# Patient Record
Sex: FEMALE | Race: BLACK OR AFRICAN AMERICAN | Employment: UNEMPLOYED | URBAN - METROPOLITAN AREA
[De-identification: names, ages, dates, MRNs, and addresses within clinical notes are randomized per-mention and may not be internally consistent; named-entity substitution may affect disease eponyms.]

---

## 2019-07-25 ENCOUNTER — HOSPITAL ENCOUNTER (OUTPATIENT)
Dept: ULTRASOUND IMAGING | Age: 59
Discharge: HOME | End: 2019-07-25
Attending: INTERNAL MEDICINE
Payer: COMMERCIAL

## 2019-07-25 DIAGNOSIS — R14.0 GASTRIC TYMPANY: ICD-10-CM

## 2019-07-25 DIAGNOSIS — N18.9 CHRONIC KIDNEY DISEASE, UNSPECIFIED: ICD-10-CM

## 2019-07-25 PROCEDURE — 76700 US EXAM ABDOM COMPLETE: CPT

## 2019-07-26 ENCOUNTER — HOSPITAL ENCOUNTER (OUTPATIENT)
Dept: CT IMAGING | Age: 59
Discharge: HOME | End: 2019-07-26
Attending: INTERNAL MEDICINE
Payer: COMMERCIAL

## 2019-07-26 DIAGNOSIS — I50.31 ACUTE DIASTOLIC HEART FAILURE (HCC): ICD-10-CM

## 2019-07-26 DIAGNOSIS — R14.0 ABDOMINAL DISTENTION: ICD-10-CM

## 2019-07-26 LAB — CREAT UR-MCNC: 1.2 MG/DL (ref 0.6–1.3)

## 2019-07-26 PROCEDURE — 74011636320 HC RX REV CODE- 636/320: Performed by: RADIOLOGY

## 2019-07-26 PROCEDURE — 82565 ASSAY OF CREATININE: CPT

## 2019-07-26 PROCEDURE — 74177 CT ABD & PELVIS W/CONTRAST: CPT

## 2019-07-26 RX ADMIN — IOHEXOL 50 ML: 240 INJECTION, SOLUTION INTRATHECAL; INTRAVASCULAR; INTRAVENOUS; ORAL at 14:19

## 2019-07-26 RX ADMIN — IOPAMIDOL 93 ML: 612 INJECTION, SOLUTION INTRAVENOUS at 14:19

## 2019-11-08 ENCOUNTER — OFFICE VISIT (OUTPATIENT)
Dept: FAMILY MEDICINE CLINIC | Age: 59
End: 2019-11-08

## 2019-11-08 VITALS
TEMPERATURE: 98 F | DIASTOLIC BLOOD PRESSURE: 52 MMHG | HEART RATE: 51 BPM | WEIGHT: 246.4 LBS | HEIGHT: 64 IN | OXYGEN SATURATION: 98 % | SYSTOLIC BLOOD PRESSURE: 132 MMHG | BODY MASS INDEX: 42.07 KG/M2 | RESPIRATION RATE: 18 BRPM

## 2019-11-08 DIAGNOSIS — I50.32 CHRONIC DIASTOLIC HEART FAILURE (HCC): Primary | ICD-10-CM

## 2019-11-08 DIAGNOSIS — Z79.4 DIABETES MELLITUS, TYPE II, INSULIN DEPENDENT (HCC): ICD-10-CM

## 2019-11-08 DIAGNOSIS — E11.9 DIABETES MELLITUS, TYPE II, INSULIN DEPENDENT (HCC): ICD-10-CM

## 2019-11-08 DIAGNOSIS — I10 ESSENTIAL HYPERTENSION WITH GOAL BLOOD PRESSURE LESS THAN 130/80: ICD-10-CM

## 2019-11-08 DIAGNOSIS — R21 RASH: ICD-10-CM

## 2019-11-08 DIAGNOSIS — N18.30 CKD (CHRONIC KIDNEY DISEASE) STAGE 3, GFR 30-59 ML/MIN (HCC): ICD-10-CM

## 2019-11-08 PROBLEM — H35.039 HYPERTENSIVE RETINOPATHY: Status: ACTIVE | Noted: 2019-11-08

## 2019-11-08 PROBLEM — E78.5 HYPERLIPIDEMIA: Status: ACTIVE | Noted: 2019-11-08

## 2019-11-08 PROBLEM — M10.09 ACUTE IDIOPATHIC GOUT OF MULTIPLE SITES: Status: ACTIVE | Noted: 2019-04-17

## 2019-11-08 PROBLEM — I50.31 ACUTE DIASTOLIC HEART FAILURE (HCC): Status: ACTIVE | Noted: 2019-09-13

## 2019-11-08 PROBLEM — D50.9 IRON DEFICIENCY ANEMIA: Status: ACTIVE | Noted: 2019-04-13

## 2019-11-08 LAB — HBA1C MFR BLD HPLC: 6.2 %

## 2019-11-08 RX ORDER — LANOLIN ALCOHOL/MO/W.PET/CERES
CREAM (GRAM) TOPICAL
COMMUNITY
End: 2020-11-06

## 2019-11-08 RX ORDER — INSULIN GLARGINE 100 [IU]/ML
60 INJECTION, SOLUTION SUBCUTANEOUS
COMMUNITY
End: 2019-12-27 | Stop reason: SDUPTHER

## 2019-11-08 RX ORDER — ASPIRIN 325 MG
325 TABLET ORAL DAILY
COMMUNITY
End: 2020-11-06 | Stop reason: SDUPTHER

## 2019-11-08 RX ORDER — BUMETANIDE 1 MG/1
2 TABLET ORAL 2 TIMES DAILY
COMMUNITY
End: 2020-02-24 | Stop reason: SDUPTHER

## 2019-11-08 RX ORDER — SPIRONOLACTONE 25 MG/1
TABLET ORAL DAILY
COMMUNITY
End: 2020-11-06 | Stop reason: SDUPTHER

## 2019-11-08 RX ORDER — ATORVASTATIN CALCIUM 40 MG/1
TABLET, FILM COATED ORAL DAILY
COMMUNITY
End: 2020-02-24 | Stop reason: SDUPTHER

## 2019-11-08 RX ORDER — GABAPENTIN 100 MG/1
CAPSULE ORAL 3 TIMES DAILY
COMMUNITY
End: 2020-03-30

## 2019-11-08 RX ORDER — METOPROLOL SUCCINATE 200 MG/1
200 TABLET, EXTENDED RELEASE ORAL DAILY
COMMUNITY
End: 2020-02-24 | Stop reason: SDUPTHER

## 2019-11-08 RX ORDER — HYDRALAZINE HYDROCHLORIDE 100 MG/1
100 TABLET, FILM COATED ORAL EVERY 8 HOURS
COMMUNITY
End: 2020-07-14 | Stop reason: SDUPTHER

## 2019-11-08 RX ORDER — METOLAZONE 5 MG/1
TABLET ORAL DAILY
COMMUNITY
End: 2020-11-06

## 2019-11-08 RX ORDER — AMLODIPINE BESYLATE 10 MG/1
TABLET ORAL DAILY
COMMUNITY
End: 2020-02-24 | Stop reason: SDUPTHER

## 2019-11-08 NOTE — PROGRESS NOTES
[facility]    CC: EOC for Chronic Disease Management    HPI:     HTN:  -Taking BP medication as prescribed  -Denies any side effects or issues with BP medication  -Does not check BP at home  -Chart review shows BP was 118/56 during last visit with cardiology on 10/3/2019  -Not following any regular exercise regimen      CKD3:  -Seeing nephrologist for issue ([facility])  -States she last saw specialist in October  -No changes done to her medication at that visit  -Reports that issue has been stable  -Next appointment with nephrologist is in January  -[facility] Chart review shows AA eGFR was 48.7 on 5/88/7271      Diastolic Heart Failure:  -Being followed by cardiology (Dr. Ashford Monday)  -Saw NP at cardiologist office for issue on 10/3/2019  -Taking medications as prescribed by cardiology  -Denies any side effects or issues with her medication  -Checks weight daily  -Denies any issues with weight gain    -Echo on 9/17/2019 shows normal diastolic dysfunction and EF of 60-65%  -Had grade II diastolic dysfunction on 2/04/2922 and 4/14/2019 and grade I on 7/5/2018 and 12/23/2019      Rash:  -Issue started 3 months  -Is on both shoulder  -Small bumps on skin  -Associated with itching  -Thinks she was diagnosed with folliculitis ([facility] chart review shows diagnosis of keratosis pilaris)  -Wishes to see specialist for issue      ROS: Positive items marked in RED  CON: fever, chills  Cardiovascular: palpitations, CP  Resp: SOB, cough  GI: nausea, vomiting, diarrhea  : dysuria, hematuria      Past Medical History:   Diagnosis Date    Congestive heart failure ([address] 2017    [facility]     Gout     Hypertension     Iron deficiency anemia 4/13/2019    Retinopathy        Past Surgical History:   Procedure Laterality Date    HX WISDOM TEETH EXTRACTION         Family History   Problem Relation Age of Onset    Diabetes Mother     No Known Problems Father        Social History     Socioeconomic History    Marital status:      Spouse name: Not on file    Number of children: Not on file    Years of education: Not on file    Highest education level: Not on file   Tobacco Use    Smoking status: Never Smoker    Smokeless tobacco: Never Used   Substance and Sexual Activity    Alcohol use: Not Currently    Drug use: Never    Sexual activity: Not Currently       Allergies no known allergies      Current Outpatient Medications:     insulin glargine (LANTUS SOLOSTAR U-100 INSULIN) 100 unit/mL (3 mL) inpn, 60 Units by SubCUTAneous route., Disp: , Rfl:     metoprolol succinate (TOPROL-XL) 200 mg XL tablet, Take 200 mg by mouth daily. , Disp: , Rfl:     bumetanide (BUMEX) 1 mg tablet, Take 2 mg by mouth two (2) times a day., Disp: , Rfl:     ferrous sulfate 325 mg (65 mg iron) tablet, Take  by mouth Daily (before breakfast). , Disp: , Rfl:     spironolactone (ALDACTONE) 25 mg tablet, Take  by mouth daily. , Disp: , Rfl:     metOLazone (ZAROXOLYN) 5 mg tablet, Take  by mouth daily. , Disp: , Rfl:     atorvastatin (LIPITOR) 40 mg tablet, Take  by mouth daily. , Disp: , Rfl:     amLODIPine (NORVASC) 10 mg tablet, Take  by mouth daily. , Disp: , Rfl:     aspirin (ASPIRIN) 325 mg tablet, Take 325 mg by mouth daily. , Disp: , Rfl:     hydrALAZINE (APRESOLINE) 100 mg tablet, Take 100 mg by mouth every eight (8) hours. , Disp: , Rfl:     gabapentin (NEURONTIN) 100 mg capsule, Take  by mouth three (3) times daily. , Disp: , Rfl:     Physical Exam:      /52   Pulse (!) 51   Temp 98 °F (36.7 °C) (Oral)   Resp 18   Ht 5' 4\" (1.626 m)   Wt 246 lb 6.4 oz (111.8 kg)   SpO2 98%   BMI 42.29 kg/m²     General: obese habitus, NAD, conversant  Eyes: sclera clear bilaterally, no discharge noted, eyelids normal in appearance  HENT: NCAT  Lungs: CTAB, normal respiratory effort and rate  CV: RRR, no MRGs  ABD: soft, non-tender, non-distended, normal bowel sounds  Skin: normal temperature, turgor, and color  Psych: alert and oriented to person, place and situation, normal affect  Neuro: speech normal, moving all extremities    Results for Ismael Da Silva (MRN [id number]):   Ref. Range 11/8/2019 13:20   Hemoglobin A1c (POC) Latest Units: % 6.2       Assessment/Plan     DMII, Well Controlled:  -HGBA1c at goal of <7%  -Will continue current diabetic medication regimen  -F/U in one month      Chronic Diastolic Heart Failure, Improving:  -Patient with normal diastolic function on last echo. -Currently no signs of decompensation  -Referred for cardiac rehab  -F/U in one month      CKD3, Stable:  -Need to obtain records from nephrologist office for review  -F/U in one month      HTN:  -Currently not at goal BP of <130/80.  Was at goal at recent appointment with cardiologist.  -Will continue current BP medication regimen, per recommendations of cardiologist  -F/U in one month      Rash:  -No significant finding on exam  -Will refer to dermatology for evaluation  -F/U in one month        Christene Rubinstein, MD  11/8/2019, 12:55 PM

## 2019-11-27 PROBLEM — I50.32 CHRONIC DIASTOLIC HEART FAILURE (HCC): Status: ACTIVE | Noted: 2019-11-27

## 2019-12-30 RX ORDER — INSULIN GLARGINE 100 [IU]/ML
60 INJECTION, SOLUTION SUBCUTANEOUS
Qty: 5 ADJUSTABLE DOSE PRE-FILLED PEN SYRINGE | Refills: 1 | Status: SHIPPED | OUTPATIENT
Start: 2019-12-30 | End: 2020-02-24 | Stop reason: SDUPTHER

## 2020-03-09 ENCOUNTER — OFFICE VISIT (OUTPATIENT)
Dept: FAMILY MEDICINE CLINIC | Age: 60
End: 2020-03-09

## 2020-03-09 ENCOUNTER — HOSPITAL ENCOUNTER (OUTPATIENT)
Dept: LAB | Age: 60
Discharge: HOME | End: 2020-03-09
Payer: MEDICAID

## 2020-03-09 VITALS
HEIGHT: 64 IN | BODY MASS INDEX: 42.51 KG/M2 | OXYGEN SATURATION: 98 % | HEART RATE: 55 BPM | SYSTOLIC BLOOD PRESSURE: 127 MMHG | WEIGHT: 249 LBS | TEMPERATURE: 97.9 F | RESPIRATION RATE: 16 BRPM | DIASTOLIC BLOOD PRESSURE: 52 MMHG

## 2020-03-09 DIAGNOSIS — M10.9 GOUT, UNSPECIFIED CAUSE, UNSPECIFIED CHRONICITY, UNSPECIFIED SITE: ICD-10-CM

## 2020-03-09 DIAGNOSIS — I50.32 CHRONIC DIASTOLIC HEART FAILURE (HCC): ICD-10-CM

## 2020-03-09 DIAGNOSIS — I10 ESSENTIAL HYPERTENSION WITH GOAL BLOOD PRESSURE LESS THAN 130/80: ICD-10-CM

## 2020-03-09 DIAGNOSIS — E11.42 DIABETIC POLYNEUROPATHY ASSOCIATED WITH TYPE 2 DIABETES MELLITUS (HCC): Primary | ICD-10-CM

## 2020-03-09 LAB — URATE SERPL-MCNC: 8.2 MG/DL (ref 2.6–7.2)

## 2020-03-09 PROCEDURE — 84550 ASSAY OF BLOOD/URIC ACID: CPT

## 2020-03-09 PROCEDURE — 36415 COLL VENOUS BLD VENIPUNCTURE: CPT

## 2020-03-09 RX ORDER — DULOXETIN HYDROCHLORIDE 60 MG/1
60 CAPSULE, DELAYED RELEASE ORAL DAILY
Qty: 30 CAP | Refills: 3 | Status: SHIPPED | OUTPATIENT
Start: 2020-03-09 | End: 2020-06-26

## 2020-03-09 RX ORDER — ALLOPURINOL 100 MG/1
TABLET ORAL
COMMUNITY
Start: 2020-01-29 | End: 2020-06-09 | Stop reason: DRUGHIGH

## 2020-03-09 NOTE — PROGRESS NOTES
[facility]    CC: Follow-up for Chronic Disease Management    HPI:       Gout:  -Denies any history of flares  -Taking allopurinol as prescribed  -Denies any side effects or issues with the medication  -Next appointment with nephrologist is in 4 months      HTN:  -Taking BP medication as prescribed  -Denies any side effects or issues with BP medication  -Not following any regular exercise regimen  -Diet is unchanged since last visit      Neuropathy:  -Says it is 2/2 diabetes  -Issue is in both feet  -Was on gabapentin for neuropathy  -Currently reports issue is inadequately controlled      ROS: Positive items marked in RED  CON: fever, chills  Cardiovascular: palpitations, CP  Resp: SOB, cough  GI: nausea, vomiting, diarrhea  : dysuria, hematuria      Past Medical History:   Diagnosis Date    Chronic kidney disease, stage 3 ([address]     [facility]     Diastolic congestive heart failure ([facility] 2017    Stage C, NYHA class IIIa    Fatty liver     Gout     HLD (hyperlipidemia)     Hypertension     Iron deficiency anemia 4/13/2019    Morbid obesity ([facility]     Retinopathy     Stasis dermatitis of both legs        Past Surgical History:   Procedure Laterality Date    HX WISDOM TEETH EXTRACTION         Family History   Problem Relation Age of Onset    Diabetes Mother     No Known Problems Father        Social History     Socioeconomic History    Marital status: UNKNOWN     Spouse name: Not on file    Number of children: Not on file    Years of education: Not on file    Highest education level: Not on file   Tobacco Use    Smoking status: Never Smoker    Smokeless tobacco: Never Used   Substance and Sexual Activity    Alcohol use: Not Currently    Drug use: Never    Sexual activity: Not Currently       No Known Allergies      Current Outpatient Medications:     allopurinoL (ZYLOPRIM) 100 mg tablet, TAKE 1 TABLET BY MOUTH ONCE DAILY, Disp: , Rfl:     insulin glargine (LANTUS SOLOSTAR U-100 INSULIN) 100 unit/mL (3 mL) inpn, 60 Units by SubCUTAneous route nightly., Disp: 5 Adjustable Dose Pre-filled Pen Syringe, Rfl: 1    metoprolol succinate (TOPROL-XL) 200 mg XL tablet, Take 1 Tab by mouth daily. , Disp: 30 Tab, Rfl: 1    bumetanide (BUMEX) 1 mg tablet, Take 2 Tabs by mouth two (2) times a day., Disp: 60 Tab, Rfl: 1    atorvastatin (LIPITOR) 40 mg tablet, Take 1 Tab by mouth daily. , Disp: 30 Tab, Rfl: 1    amLODIPine (NORVASC) 10 mg tablet, Take 1 Tab by mouth daily. , Disp: 30 Tab, Rfl: 1    ferrous sulfate 325 mg (65 mg iron) tablet, Take  by mouth Daily (before breakfast). , Disp: , Rfl:     spironolactone (ALDACTONE) 25 mg tablet, Take  by mouth daily. , Disp: , Rfl:     aspirin (ASPIRIN) 325 mg tablet, Take 325 mg by mouth daily. , Disp: , Rfl:     hydrALAZINE (APRESOLINE) 100 mg tablet, Take 100 mg by mouth every eight (8) hours. , Disp: , Rfl:     metOLazone (ZAROXOLYN) 5 mg tablet, Take  by mouth daily. , Disp: , Rfl:     gabapentin (NEURONTIN) 100 mg capsule, Take  by mouth three (3) times daily. , Disp: , Rfl:     Physical Exam:      /52   Pulse (!) 55   Temp 97.9 °F (36.6 °C) (Oral)   Resp 16   Ht 5' 4\" (1.626 m)   Wt 249 lb (112.9 kg)   SpO2 98%   BMI 42.74 kg/m²     General: Obese habitus, NAD, conversant  Eyes: sclera clear bilaterally, no discharge noted, eyelids normal in appearance  HENT: NCAT  Lungs: CTAB, normal respiratory effort and rate  CV: RRR, no MRGs  ABD: soft, non-tender, non-distended, normal bowel sounds  Skin: normal temperature, turgor, color, and texture  Psych: alert and oriented to person, place and situation, normal affect  Neuro: speech normal, moving all extremities      Assessment/Plan     Neuropathy:  -Secondary to diabetes (per patient report)  -Advised that I do not prescribe controlled substances for pain management  -Started on Cymbalta regimen  -Follow-up in 1 month for neuropathy and gout      Gout:  -Will continue current allopurinol regimen per recommendations of nephrologist  -Uric acid level ordered  -Need to obtain records from nephrology for review  -Follow-up in 1 month for neuropathy and gout      HTN, well controlled:  -BP at goal of less than 130/80  -Will continue current BP medication regimen  -Follow-up in 1 month for neuropathy and gout        Katy Schneider MD  3/9/2020, 2:56 PM

## 2020-03-09 NOTE — PROGRESS NOTES
1. Have you been to the ER, urgent care clinic since your last visit? Hospitalized since your last visit? No    2. Have you seen or consulted any other health care providers outside of the [address] [place] [address] since your last visit? Include any pap smears or colon screening.  No     Patient was informed that Dr. Connor Grover does not prescribe nor refill controlled substances

## 2020-03-30 RX ORDER — PEN NEEDLE, DIABETIC 31 GX3/16"
NEEDLE, DISPOSABLE MISCELLANEOUS
Qty: 200 PEN NEEDLE | Refills: 3 | Status: SHIPPED | OUTPATIENT
Start: 2020-03-30 | End: 2020-04-29 | Stop reason: SDUPTHER

## 2020-04-23 ENCOUNTER — VIRTUAL VISIT (OUTPATIENT)
Dept: FAMILY MEDICINE CLINIC | Age: 60
End: 2020-04-23

## 2020-04-23 NOTE — PROGRESS NOTES
1. Have you been to the ER, urgent care clinic since your last visit? Hospitalized since your last visit? No    2. Have you seen or consulted any other health care providers outside of the [address] [place] [address] since your last visit? Include any pap smears or colon screening. No      Patient fell 4-1-20 and hit head. Was nauseated and vomited for 2 days. No pain now in head. A user error has taken place: encounter opened in error, closed for administrative reasons.

## 2020-04-30 RX ORDER — AMLODIPINE BESYLATE 10 MG/1
10 TABLET ORAL DAILY
Qty: 30 TAB | Refills: 1 | Status: SHIPPED | OUTPATIENT
Start: 2020-04-30 | End: 2020-06-26

## 2020-04-30 RX ORDER — METOPROLOL SUCCINATE 200 MG/1
200 TABLET, EXTENDED RELEASE ORAL DAILY
Qty: 30 TAB | Refills: 1 | Status: SHIPPED | OUTPATIENT
Start: 2020-04-30 | End: 2020-06-26

## 2020-04-30 RX ORDER — ATORVASTATIN CALCIUM 40 MG/1
40 TABLET, FILM COATED ORAL DAILY
Qty: 30 TAB | Refills: 1 | Status: SHIPPED | OUTPATIENT
Start: 2020-04-30 | End: 2020-06-26

## 2020-04-30 RX ORDER — PEN NEEDLE, DIABETIC 31 GX3/16"
NEEDLE, DISPOSABLE MISCELLANEOUS
Qty: 200 PEN NEEDLE | Refills: 3 | Status: SHIPPED | OUTPATIENT
Start: 2020-04-30 | End: 2020-06-26 | Stop reason: SDUPTHER

## 2020-04-30 RX ORDER — INSULIN GLARGINE 100 [IU]/ML
60 INJECTION, SOLUTION SUBCUTANEOUS
Qty: 5 ADJUSTABLE DOSE PRE-FILLED PEN SYRINGE | Refills: 1 | Status: SHIPPED | OUTPATIENT
Start: 2020-04-30 | End: 2020-06-26 | Stop reason: SDUPTHER

## 2020-05-21 ENCOUNTER — OFFICE VISIT (OUTPATIENT)
Dept: FAMILY MEDICINE CLINIC | Age: 60
End: 2020-05-21

## 2020-05-21 VITALS
RESPIRATION RATE: 16 BRPM | SYSTOLIC BLOOD PRESSURE: 140 MMHG | BODY MASS INDEX: 42.51 KG/M2 | TEMPERATURE: 98.6 F | OXYGEN SATURATION: 96 % | WEIGHT: 249 LBS | HEIGHT: 64 IN | DIASTOLIC BLOOD PRESSURE: 53 MMHG | HEART RATE: 62 BPM

## 2020-05-21 DIAGNOSIS — Z79.4 DIABETES MELLITUS, TYPE II, INSULIN DEPENDENT (HCC): ICD-10-CM

## 2020-05-21 DIAGNOSIS — E66.01 OBESITY, MORBID (HCC): ICD-10-CM

## 2020-05-21 DIAGNOSIS — N18.30 CKD (CHRONIC KIDNEY DISEASE) STAGE 3, GFR 30-59 ML/MIN (HCC): ICD-10-CM

## 2020-05-21 DIAGNOSIS — I50.32 CHRONIC DIASTOLIC HEART FAILURE (HCC): ICD-10-CM

## 2020-05-21 DIAGNOSIS — M10.9 GOUT, UNSPECIFIED CAUSE, UNSPECIFIED CHRONICITY, UNSPECIFIED SITE: Primary | ICD-10-CM

## 2020-05-21 DIAGNOSIS — E11.9 DIABETES MELLITUS, TYPE II, INSULIN DEPENDENT (HCC): ICD-10-CM

## 2020-05-21 DIAGNOSIS — H26.9 CATARACT OF BOTH EYES, UNSPECIFIED CATARACT TYPE: ICD-10-CM

## 2020-05-21 DIAGNOSIS — E11.42 DIABETIC POLYNEUROPATHY ASSOCIATED WITH TYPE 2 DIABETES MELLITUS (HCC): ICD-10-CM

## 2020-05-21 DIAGNOSIS — I10 ESSENTIAL HYPERTENSION WITH GOAL BLOOD PRESSURE LESS THAN 130/80: ICD-10-CM

## 2020-05-21 RX ORDER — GUAIFENESIN 100 MG/5ML
81 LIQUID (ML) ORAL DAILY
COMMUNITY
Start: 2018-12-20

## 2020-05-21 NOTE — PROGRESS NOTES
1. Have you been to the ER, urgent care clinic since your last visit? Hospitalized since your last visit? No    2. Have you seen or consulted any other health care providers outside of the [address] [place] [address] since your last visit? Include any pap smears or colon screening. No      Patient states she has not taken her medication today. A user error has taken place: encounter opened in error, closed for administrative reasons.

## 2020-05-21 NOTE — PROGRESS NOTES
A user error has taken place: encounter opened in error, closed for administrative reasons. Surgery cancelled.

## 2020-06-09 ENCOUNTER — OFFICE VISIT (OUTPATIENT)
Dept: FAMILY MEDICINE CLINIC | Age: 60
End: 2020-06-09

## 2020-06-09 VITALS
HEIGHT: 64 IN | WEIGHT: 249 LBS | RESPIRATION RATE: 16 BRPM | TEMPERATURE: 98.7 F | BODY MASS INDEX: 42.51 KG/M2 | OXYGEN SATURATION: 95 % | SYSTOLIC BLOOD PRESSURE: 131 MMHG | DIASTOLIC BLOOD PRESSURE: 56 MMHG | HEART RATE: 67 BPM

## 2020-06-09 DIAGNOSIS — Z79.4 DIABETES MELLITUS, TYPE II, INSULIN DEPENDENT (HCC): Primary | ICD-10-CM

## 2020-06-09 DIAGNOSIS — I10 ESSENTIAL HYPERTENSION WITH GOAL BLOOD PRESSURE LESS THAN 130/80: ICD-10-CM

## 2020-06-09 DIAGNOSIS — E79.0 HYPERURICEMIA: ICD-10-CM

## 2020-06-09 DIAGNOSIS — E11.9 DIABETES MELLITUS, TYPE II, INSULIN DEPENDENT (HCC): Primary | ICD-10-CM

## 2020-06-09 DIAGNOSIS — H26.9 CATARACT OF BOTH EYES, UNSPECIFIED CATARACT TYPE: ICD-10-CM

## 2020-06-09 DIAGNOSIS — I50.32 CHRONIC DIASTOLIC HEART FAILURE (HCC): ICD-10-CM

## 2020-06-09 LAB — HBA1C MFR BLD HPLC: 9.7 %

## 2020-06-09 RX ORDER — ALLOPURINOL 100 MG/1
200 TABLET ORAL DAILY
Qty: 60 TAB | Refills: 2 | Status: SHIPPED | OUTPATIENT
Start: 2020-06-09 | End: 2020-07-14 | Stop reason: SDUPTHER

## 2020-06-09 NOTE — PROGRESS NOTES
Ara [facility]    CC: F/U for Chronic Disease Management    HPI:       DMII:  -Taking diabetic medication as prescribed  -Does not regularly check her blood sugar  -Reports that her last FBS was in the 200s  -Diet is unchanged from last visit  -Not following any regular exercise regimen      Hyperuricemia:  -Got requested lab work  -Denies any history of gout flares  -Taking allopurinol as prescribed  -Denies any side effects or issues with the medication  -Diet is unchanged from last visit  -Next appointment with nephrologist is in 4 months        HTN:   -Taking BP medication as prescribed  -Denies any side effects or issues with BP medication  -Does not check blood pressure at home  -Not following any regular exercise regimen  -Diet is unchanged since last visit       Heart Failure:  -Last saw cardiologist in April  -Reports no changes were done to her medication at the time  -Denies any swelling of her lower extremities or issues with weight gain (States she has lost some weight)      Cataracts:   -Has issue in both eyes  -Wishes to have preop clearance for cataract surgery  -Has not had any cardiac clearance for surgery      ROS: Positive items marked in RED  CON: fever, chills  Cardiovascular: palpitations, CP  Resp: SOB, cough  GI: nausea, vomiting, diarrhea  : dysuria, hematuria  Endo: Polyuria (states it is 2/2 her diuretic), Polydypsia    Past Medical History:   Diagnosis Date    Cataracts, bilateral     Chronic kidney disease, stage 3 ([address]     Diabetes mellitus, type II ([address]     Diastolic congestive heart failure ([address] 2017    Stage C, NYHA class IIIa    Fatty liver     Gout     HLD (hyperlipidemia)     Hypertension     Iron deficiency anemia 4/13/2019    Morbid obesity ([address]     Retinopathy     Stasis dermatitis of both legs        Past Surgical History:   Procedure Laterality Date    HX WISDOM TEETH EXTRACTION         Family History   Problem Relation Age of Onset    Diabetes Mother     No Known Problems Father        Social History     Tobacco Use    Smoking status: Never Smoker    Smokeless tobacco: Never Used   Substance Use Topics    Alcohol use: Not Currently    Drug use: Never       No Known Allergies      Current Outpatient Medications:     aspirin 81 mg chewable tablet, Take 81 mg by mouth daily. , Disp: , Rfl:     insulin glargine (Lantus Solostar U-100 Insulin) 100 unit/mL (3 mL) inpn, 60 Units by SubCUTAneous route nightly., Disp: 5 Adjustable Dose Pre-filled Pen Syringe, Rfl: 1    metoprolol succinate (TOPROL-XL) 200 mg XL tablet, Take 1 Tab by mouth daily. , Disp: 30 Tab, Rfl: 1    Insulin Needles, Disposable, (ReliOn Pen Needles) 32 gauge x 5/32\" ndle, Use 1 pen needle to inject beneath the skin twice daily, Disp: 200 Pen Needle, Rfl: 3    atorvastatin (LIPITOR) 40 mg tablet, Take 1 Tab by mouth daily. , Disp: 30 Tab, Rfl: 1    amLODIPine (NORVASC) 10 mg tablet, Take 1 Tab by mouth daily. , Disp: 30 Tab, Rfl: 1    allopurinoL (ZYLOPRIM) 100 mg tablet, TAKE 1 TABLET BY MOUTH ONCE DAILY, Disp: , Rfl:     DULoxetine (CYMBALTA) 60 mg capsule, Take 1 Cap by mouth daily. Indications: diabetic complication causing injury to some body nerves, Disp: 30 Cap, Rfl: 3    bumetanide (BUMEX) 1 mg tablet, Take 2 Tabs by mouth two (2) times a day., Disp: 60 Tab, Rfl: 1    ferrous sulfate 325 mg (65 mg iron) tablet, Take  by mouth Daily (before breakfast). , Disp: , Rfl:     spironolactone (ALDACTONE) 25 mg tablet, Take  by mouth daily. , Disp: , Rfl:     metOLazone (ZAROXOLYN) 5 mg tablet, Take  by mouth daily. , Disp: , Rfl:     hydrALAZINE (APRESOLINE) 100 mg tablet, Take 100 mg by mouth every eight (8) hours. , Disp: , Rfl:     aspirin (ASPIRIN) 325 mg tablet, Take 325 mg by mouth daily. , Disp: , Rfl:     Physical Exam:      /56   Pulse 67   Temp 98.7 °F (37.1 °C) (Oral)   Resp 16   Ht 5' 4\" (1.626 m)   Wt 249 lb (112.9 kg)   SpO2 95%   BMI 42.74 kg/m² General: obese habitus, NAD, conversant  Eyes: sclera clear bilaterally, no discharge noted, eyelids normal in appearance  HENT: NCAT  Lungs: CTAB, normal respiratory effort and rate  CV: RRR, no MRGs  ABD: soft, non-tender, non-distended, normal bowel sounds  Ext: no peripheral edema or digital cyanosis noted  Skin: normal temperature, turgor, color, and texture  Psych: alert and oriented to person, place and situation, normal affect  Neuro: speech normal, moving all extremities      Results for Joe Antonio (MRN [id number]):   Ref. Range 6/9/2020 15:37   Hemoglobin A1c (POC) Latest Units: % 9.7         Ref.  Range 3/9/2020 15:28   Uric acid Latest Ref Range: 2.6 - 7.2 MG/DL 8.2 (H)       Assessment/Plan     DMII, inadequately controlled  -Hemoglobin A1c not at goal of less than 7%  -Suspect her polyuria and weight loss is secondary to her inadequate control diabetes  -5 mg of Tradjenta added to current diabetic medication regimen  -Patient advised on importance of checking her blood sugar regularly and importance of bringing a BS log to her appointments so that her insulin regimen can be adjusted  -Fructosamine, CBC, CMP, fasting lipid panel, and urine microalbumin/creatinine ordered  -Follow-up in 1 month      Hyperuricemia, inadequately controlled:  -Not at goal of < 6  -Allopurinol dose increased to 200 mg  -Uric acid level ordered to be checked prior to next visit  -Follow-up in 1 month       HTN:   -BP currently not at goal of less than 130/80 (Has been previously at goal in the past on current regimen)  -Will defer adjusting BP medication given her low diastolic BP   -CBC, CMP, fasting lipid panel, and urine microalbumin/creatinine ordered  -Follow-up in 1 month      Chronic Diastolic Heart Failure:  -Appears to be compensated  -Need to obtain prior cardiology records for review  -Follow-up in 1 month      Cataracts:   -Advised that I would not clear for surgery given that she had too many issues that need to be evaluated  -Discussed that poorly controlled diabetes increased risk of poor healing and postop infections  -Follow-up in 1 month        Yvon Beltran MD   6/9/2020 3:39 PM

## 2020-06-09 NOTE — PROGRESS NOTES
1. Have you been to the ER, urgent care clinic since your last visit? Hospitalized since your last visit? No    2. Have you seen or consulted any other health care providers outside of the [address] [place] [address] since your last visit? Include any pap smears or colon screening.  No

## 2020-06-25 LAB
ALB/GLOBRATIO, 58C: 1.2 (CALC) (ref 1–2.5)
ALBUMIN SERPL-MCNC: 4.3 G/DL (ref 3.6–5.1)
ALKALINE PHOSPHATASE, TOTAL, 25002000: 124 U/L (ref 37–153)
ALT SERPL-CCNC: 19 U/L (ref 6–29)
AST SERPL W P-5'-P-CCNC: 21 U/L (ref 10–35)
BILIRUB SERPL-MCNC: 0.5 MG/DL (ref 0.2–1.2)
BUN SERPL-MCNC: 41 MG/DL (ref 7–25)
BUN/CREATININE RATIO,BUCR: 26 (CALC) (ref 6–22)
CALCIUM SERPL-MCNC: 9.4 MG/DL (ref 8.6–10.4)
CHLORIDE SERPL-SCNC: 102 MMOL/L (ref 98–110)
CHOL/HDL RATIO,CHHDX: 4.9 (CALC)
CHOLEST SERPL-MCNC: 163 MG/DL
CO2 SERPL-SCNC: 32 MMOL/L (ref 20–32)
CREAT SERPL-MCNC: 1.59 MG/DL (ref 0.5–0.99)
CREATININE URINE,9612018: 116 MG/DL (ref 20–275)
ERYTHROCYTE [DISTWIDTH] IN BLOOD BY AUTOMATED COUNT: 16.8 % (ref 11–15)
GLOBULIN,GLOB: 3.6 G/DL (CALC) (ref 1.9–3.7)
GLUCOSE SERPL-MCNC: 179 MG/DL (ref 65–99)
HCT VFR BLD AUTO: 34.7 % (ref 35–45)
HDLC SERPL-MCNC: 33 MG/DL
HGB BLD-MCNC: 11.9 G/DL (ref 11.7–15.5)
LDL-CHOLESTEROL: 90 MG/DL (CALC)
MCH RBC QN AUTO: 28.1 PG (ref 27–33)
MCHC RBC AUTO-ENTMCNC: 34.3 G/DL (ref 32–36)
MCV RBC AUTO: 81.8 FL (ref 80–100)
MICROALBUMIN,URINE RANDOM 140054: 15.2 MG/DL
MICROALBUMIN/CREAT RATIO: 131 MCG/MG CREAT
NON-HDL CHOLESTEROL, 011976: 130 MG/DL (CALC)
PLATELET # BLD AUTO: 101 THOUSAND/UL (ref 140–400)
PMV BLD AUTO: 12.3 FL (ref 7.5–12.5)
POTASSIUM SERPL-SCNC: 4.4 MMOL/L (ref 3.5–5.3)
PROT SERPL-MCNC: 7.9 G/DL (ref 6.1–8.1)
RBC # BLD AUTO: 4.24 MILLION/UL (ref 3.8–5.1)
SODIUM SERPL-SCNC: 142 MMOL/L (ref 135–146)
TRIGL SERPL-MCNC: 300 MG/DL (ref ?–150)
WBC # BLD AUTO: 9.1 THOUSAND/UL (ref 3.8–10.8)

## 2020-07-14 ENCOUNTER — TELEPHONE (OUTPATIENT)
Dept: FAMILY MEDICINE CLINIC | Age: 60
End: 2020-07-14

## 2020-07-14 ENCOUNTER — OFFICE VISIT (OUTPATIENT)
Dept: FAMILY MEDICINE CLINIC | Age: 60
End: 2020-07-14

## 2020-07-14 ENCOUNTER — VIRTUAL VISIT (OUTPATIENT)
Dept: FAMILY MEDICINE CLINIC | Age: 60
End: 2020-07-14

## 2020-07-14 VITALS
BODY MASS INDEX: 42.37 KG/M2 | DIASTOLIC BLOOD PRESSURE: 58 MMHG | RESPIRATION RATE: 18 BRPM | TEMPERATURE: 98.5 F | HEART RATE: 63 BPM | SYSTOLIC BLOOD PRESSURE: 135 MMHG | HEIGHT: 64 IN | WEIGHT: 248.2 LBS | OXYGEN SATURATION: 95 %

## 2020-07-14 DIAGNOSIS — E78.5 HYPERLIPIDEMIA, UNSPECIFIED HYPERLIPIDEMIA TYPE: ICD-10-CM

## 2020-07-14 DIAGNOSIS — E79.0 HYPERURICEMIA: ICD-10-CM

## 2020-07-14 DIAGNOSIS — E11.9 DIABETES MELLITUS, TYPE II, INSULIN DEPENDENT (HCC): Primary | ICD-10-CM

## 2020-07-14 DIAGNOSIS — Z79.4 DIABETES MELLITUS, TYPE II, INSULIN DEPENDENT (HCC): Primary | ICD-10-CM

## 2020-07-14 DIAGNOSIS — I10 ESSENTIAL HYPERTENSION WITH GOAL BLOOD PRESSURE LESS THAN 130/80: ICD-10-CM

## 2020-07-14 RX ORDER — METOPROLOL SUCCINATE 200 MG/1
200 TABLET, EXTENDED RELEASE ORAL DAILY
Qty: 90 TAB | Refills: 1 | Status: SHIPPED | OUTPATIENT
Start: 2020-07-14 | End: 2020-11-06 | Stop reason: ALTCHOICE

## 2020-07-14 RX ORDER — INSULIN GLARGINE 100 [IU]/ML
63 INJECTION, SOLUTION SUBCUTANEOUS
Qty: 5 ADJUSTABLE DOSE PRE-FILLED PEN SYRINGE | Refills: 1 | Status: SHIPPED | OUTPATIENT
Start: 2020-07-14 | End: 2020-09-27

## 2020-07-14 RX ORDER — ATORVASTATIN CALCIUM 40 MG/1
TABLET, FILM COATED ORAL
Qty: 30 TAB | Refills: 2 | Status: CANCELLED | OUTPATIENT
Start: 2020-07-14

## 2020-07-14 RX ORDER — ALLOPURINOL 100 MG/1
200 TABLET ORAL DAILY
Qty: 60 TAB | Refills: 2 | Status: SHIPPED | OUTPATIENT
Start: 2020-07-14 | End: 2020-11-06 | Stop reason: SDUPTHER

## 2020-07-14 RX ORDER — ATORVASTATIN CALCIUM 80 MG/1
80 TABLET, FILM COATED ORAL DAILY
Qty: 90 TAB | Refills: 1 | Status: SHIPPED | OUTPATIENT
Start: 2020-07-14 | End: 2020-11-06 | Stop reason: SDUPTHER

## 2020-07-14 RX ORDER — HYDRALAZINE HYDROCHLORIDE 100 MG/1
100 TABLET, FILM COATED ORAL 3 TIMES DAILY
Qty: 270 TAB | Refills: 1 | Status: SHIPPED | OUTPATIENT
Start: 2020-07-14

## 2020-07-14 RX ORDER — AMLODIPINE BESYLATE 10 MG/1
TABLET ORAL
Qty: 30 TAB | Refills: 2 | Status: SHIPPED | OUTPATIENT
Start: 2020-07-14 | End: 2020-07-27 | Stop reason: SDUPTHER

## 2020-07-14 RX ORDER — SPIRONOLACTONE 25 MG/1
TABLET ORAL DAILY
Status: CANCELLED | OUTPATIENT
Start: 2020-07-14

## 2020-07-14 RX ORDER — BUMETANIDE 1 MG/1
TABLET ORAL
Qty: 60 TAB | Refills: 0 | Status: CANCELLED | OUTPATIENT
Start: 2020-07-14

## 2020-07-14 NOTE — TELEPHONE ENCOUNTER
I have attempted without success to contact patient by phone to check in for virtual visit. Voicemailbox not set up yet.

## 2020-07-14 NOTE — PROGRESS NOTES
Chief Complaint   Patient presents with    Follow Up Chronic Condition     HTN, DM     1. Have you been to the ER, urgent care clinic since your last visit? Hospitalized since your last visit? No    2. Have you seen or consulted any other health care providers outside of the [address] [place] [address] since your last visit? Include any pap smears or colon screening.  No

## 2020-07-14 NOTE — PROGRESS NOTES
Ekaterina Rothman    CC: Follow-up for chronic disease management    HPI:     DMII:  -Taking diabetic medication as prescribed  -Has been checking her blood sugar at home, but no log brought in for review  -Reports that her FBS has not been at goal of <130  -Diet is unchanged from last visit  -Not following any regular exercise regimen        Hyperuricemia:  -Got requested lab work, but uric acid test not done due to lab. sending testing  -Taking allopurinol as prescribed  -Denies any side effects or issues with the medication  -Diet is unchanged from last visit  -Denies any history of gout flares        HTN:   -Got requested lab work  -Taking BP medication as prescribed  -Denies any side effects or issues with BP medication  -Does not check blood pressure at home  -Not following any regular exercise regimen  -Diet is unchanged since last visit      HLD:  -Got requested lab work  -Taking statin as prescribed  -Denies any side effects or issues with the medication  -Not following any regular exercise regimen  -Diet is unchanged since last visit      ROS: Positive items marked in RED  CON: fever, chills  Cardiovascular: palpitations, CP  Resp: SOB, cough  GI: nausea, vomiting, diarrhea  : dysuria, hematuria    Past Medical History:   Diagnosis Date    Cataracts, bilateral     Chronic kidney disease, stage 3 ([address]     Diabetes mellitus, type II ([address]     Diastolic congestive heart failure ([address] 2017    Stage C, NYHA class IIIa    Fatty liver     Gout     HLD (hyperlipidemia)     Hypertension     Iron deficiency anemia 4/13/2019    Morbid obesity ([address]     Retinopathy     Stasis dermatitis of both legs        Past Surgical History:   Procedure Laterality Date    HX WISDOM TEETH EXTRACTION         Family History   Problem Relation Age of Onset    Diabetes Mother     No Known Problems Father        Social History     Tobacco Use    Smoking status: Never Smoker    Smokeless tobacco: Never Used Substance Use Topics    Alcohol use: Not Currently    Drug use: Never       No Known Allergies      Current Outpatient Medications:     atorvastatin (LIPITOR) 40 mg tablet, Take 1 tablet by mouth once daily, Disp: 30 Tab, Rfl: 0    amLODIPine (NORVASC) 10 mg tablet, Take 1 tablet by mouth once daily, Disp: 30 Tab, Rfl: 0    DULoxetine (CYMBALTA) 60 mg capsule, TAKE 1 CAPSULE BY MOUTH ONCE DAILY FOR  DIABETIC  COMPLICATIONS  CAUSING  INJURY  TO  SOME  BODY  NERVES, Disp: 30 Cap, Rfl: 0    metoprolol succinate (TOPROL-XL) 200 mg XL tablet, Take 1 tablet by mouth once daily, Disp: 30 Tab, Rfl: 0    bumetanide (BUMEX) 1 mg tablet, Take 2 tablets by mouth twice daily, Disp: 60 Tab, Rfl: 0    insulin glargine (Lantus Solostar U-100 Insulin) 100 unit/mL (3 mL) inpn, 60 Units by SubCUTAneous route nightly., Disp: 5 Adjustable Dose Pre-filled Pen Syringe, Rfl: 1    Insulin Needles, Disposable, (ReliOn Pen Needles) 32 gauge x 5/32\" ndle, Use 1 pen needle to inject beneath the skin twice daily, Disp: 200 Pen Needle, Rfl: 3    allopurinoL (ZYLOPRIM) 100 mg tablet, Take 2 Tabs by mouth daily. , Disp: 60 Tab, Rfl: 2    linaGLIPtin (TRADJENTA) 5 mg tablet, Take 1 Tab by mouth daily. , Disp: 90 Tab, Rfl: 2    aspirin 81 mg chewable tablet, Take 81 mg by mouth daily. , Disp: , Rfl:     ferrous sulfate 325 mg (65 mg iron) tablet, Take  by mouth Daily (before breakfast). , Disp: , Rfl:     spironolactone (ALDACTONE) 25 mg tablet, Take  by mouth daily. , Disp: , Rfl:     aspirin (ASPIRIN) 325 mg tablet, Take 325 mg by mouth daily. , Disp: , Rfl:     hydrALAZINE (APRESOLINE) 100 mg tablet, Take 100 mg by mouth every eight (8) hours. , Disp: , Rfl:     metOLazone (ZAROXOLYN) 5 mg tablet, Take  by mouth daily. , Disp: , Rfl:     Physical Exam:      /58   Pulse 63   Temp 98.5 °F (36.9 °C) (Oral)   Resp 18   Ht 5' 4\" (1.626 m)   Wt 248 lb 3.2 oz (112.6 kg)   SpO2 95%   BMI 42.60 kg/m²     General:  Obese habitus, conversant  Eyes: sclera clear bilaterally, no discharge noted, eyelids normal in appearance  HENT: NCAT  Lungs: CTAB, normal respiratory effort and rate  CV: RRR, no MRGs  ABD: soft, non-tender, non-distended, normal bowel sounds  Ext: no peripheral edema or digital cyanosis noted  Skin: normal temperature, turgor, color, and texture  Psych: alert and oriented to person, place and situation, normal affect  Neuro: speech normal, moving all extremities    Results for [place] [facility] (MRN [id number]):   Ref.  Range 6/23/2020 16:01   WBC Latest Ref Range: 3.8 - 10.8 Thousand/uL 9.1   RBC Latest Ref Range: 3.80 - 5.10 Million/uL 4.24   HGB Latest Ref Range: 11.7 - 15.5 g/dL 11.9   HCT Latest Ref Range: 35.0 - 45.0 % 34.7 (L)   MCV Latest Ref Range: 80.0 - 100.0 fL 81.8   MCH Latest Ref Range: 27.0 - 33.0 pg 28.1   MCHC Latest Ref Range: 32.0 - 36.0 g/dL 34.3   RDW Latest Ref Range: 11.0 - 15.0 % 16.8 (H)   PLATELET Latest Ref Range: 140 - 400 Thousand/uL 101 (L)   MEAN PLATELET VOLUME Latest Ref Range: 7.5 - 12.5 fL 12.3   Sodium Latest Ref Range: 135 - 146 mmol/L 142   Potassium Latest Ref Range: 3.5 - 5.3 mmol/L 4.4   Chloride Latest Ref Range: 98 - 110 mmol/L 102   CO2 Latest Ref Range: 20 - 32 mmol/L 32   Glucose Latest Ref Range: 65 - 99 mg/dL 179 (H)   BUN Latest Ref Range: 7 - 25 mg/dL 41 (H)   Creatinine Latest Ref Range: 0.50 - 0.99 mg/dL 1.59 (H)   BUN/Creatinine ratio Latest Ref Range: 6 - 22 (calc) 26 (H)   Calcium Latest Ref Range: 8.6 - 10.4 mg/dL 9.4   GFR est non-AA Latest Ref Range: > OR = 60 mL/min/1.73m2 35 (L)   GFR est AA Latest Ref Range: > OR = 60 mL/min/1.73m2 40 (L)   Bilirubin, total Latest Ref Range: 0.2 - 1.2 mg/dL 0.5   Protein, total Latest Ref Range: 6.1 - 8.1 g/dL 7.9   Albumin Latest Ref Range: 3.6 - 5.1 g/dL 4.3   Globulin Latest Ref Range: 1.9 - 3.7 g/dL (calc) 3.6   ALT Latest Ref Range: 6 - 29 U/L 19   AST Latest Ref Range: 10 - 35 U/L 21   Triglyceride Latest Ref Range: <150 mg/dL 300 (H)   Cholesterol, total Latest Ref Range: <200 mg/dL 163   HDL Cholesterol Latest Ref Range: > OR = 50 mg/dL 33 (L)   Non-HDL Cholesterol Latest Ref Range: <130 mg/dL (calc) 130 (H)   LDL-CHOLESTEROL Latest Units: mg/dL (calc) 90   Cholesterol/HDL ratio Latest Ref Range: <5.0 (calc) 4.9   Microalbumin, urine Latest Units: mg/dL 15.2   Alkaline Phosphatase, total Latest Ref Range: 37 - 153 U/L 124   Microalbumin/Creat Ratio Latest Ref Range: <30 mcg/mg creat 131 (H)       Assessment/Plan     DMII, inadequately controlled  -FBS not at goal of less than 130  -Lantus dose increased to 63 units  -Patient counseled on proper technique of adjusting her Lantus dose based on her FBS  -Instructed her to start titrating up her home Lantus  -Follow-up in 1 month        HLD, inadequately controlled:  -Atorvastatin dose increased to 80 mg  -Follow-up in 1 month       HTN:   -Systolic BP currently not at goal of less than 160, but diastolic BP is too low making it inadvisable to further strengthen her blood pressure medication regimen  -Will continue to defer adjusting BP her medication   -Follow-up in 1 month       Hyperuricemia,  likely improved:  -Will continue current allopurinol regimen  -Plan to check uric acid level at next visit  -Follow-up in 1 month        Charli Talamantes MD  7/14/2020, 2:47 PM

## 2020-08-08 DIAGNOSIS — E11.42 DIABETIC POLYNEUROPATHY ASSOCIATED WITH TYPE 2 DIABETES MELLITUS (HCC): ICD-10-CM

## 2020-08-10 RX ORDER — DULOXETIN HYDROCHLORIDE 60 MG/1
CAPSULE, DELAYED RELEASE ORAL
Qty: 30 CAP | Refills: 0 | Status: SHIPPED | OUTPATIENT
Start: 2020-08-10 | End: 2020-11-06

## 2020-08-14 ENCOUNTER — VIRTUAL VISIT (OUTPATIENT)
Dept: FAMILY MEDICINE CLINIC | Age: 60
End: 2020-08-14

## 2020-08-17 ENCOUNTER — VIRTUAL VISIT (OUTPATIENT)
Dept: FAMILY MEDICINE CLINIC | Age: 60
End: 2020-08-17

## 2020-08-17 NOTE — PROGRESS NOTES
1. Have you been to the ER, urgent care clinic since your last visit? Hospitalized since your last visit? No    2. Have you seen or consulted any other health care providers outside of the [address] [place] [address] since your last visit? Include any pap smears or colon screening. No    This encounter was created in error - please disregard.

## 2020-08-17 NOTE — PROGRESS NOTES
A user error has taken place: encounter opened in error, closed for administrative reasons. Did not respond to texts to start the visit.

## 2020-09-23 DIAGNOSIS — E11.9 DIABETES MELLITUS, TYPE II, INSULIN DEPENDENT (HCC): ICD-10-CM

## 2020-09-23 DIAGNOSIS — Z79.4 DIABETES MELLITUS, TYPE II, INSULIN DEPENDENT (HCC): ICD-10-CM

## 2020-09-23 DIAGNOSIS — I10 ESSENTIAL HYPERTENSION WITH GOAL BLOOD PRESSURE LESS THAN 130/80: ICD-10-CM

## 2020-09-27 RX ORDER — AMLODIPINE BESYLATE 10 MG/1
TABLET ORAL
Qty: 90 TAB | Refills: 0 | Status: SHIPPED | OUTPATIENT
Start: 2020-09-27 | End: 2020-11-06 | Stop reason: ALTCHOICE

## 2020-09-27 RX ORDER — INSULIN GLARGINE 100 [IU]/ML
63 INJECTION, SOLUTION SUBCUTANEOUS
Qty: 15 ML | Refills: 2 | Status: SHIPPED | OUTPATIENT
Start: 2020-09-27 | End: 2020-11-06 | Stop reason: SDUPTHER

## 2020-10-02 LAB — HBA1C MFR BLD HPLC: 9.6 %

## 2020-11-06 ENCOUNTER — OFFICE VISIT (OUTPATIENT)
Dept: FAMILY MEDICINE CLINIC | Age: 60
End: 2020-11-06
Payer: COMMERCIAL

## 2020-11-06 ENCOUNTER — HOSPITAL ENCOUNTER (OUTPATIENT)
Dept: LAB | Age: 60
Discharge: HOME | End: 2020-11-06
Payer: COMMERCIAL

## 2020-11-06 DIAGNOSIS — E79.0 HYPERURICEMIA: ICD-10-CM

## 2020-11-06 DIAGNOSIS — R21 RASH: ICD-10-CM

## 2020-11-06 DIAGNOSIS — E11.9 DIABETES MELLITUS, TYPE II, INSULIN DEPENDENT (HCC): Primary | ICD-10-CM

## 2020-11-06 DIAGNOSIS — I50.32 CHRONIC DIASTOLIC HEART FAILURE (HCC): ICD-10-CM

## 2020-11-06 DIAGNOSIS — E78.5 HYPERLIPIDEMIA, UNSPECIFIED HYPERLIPIDEMIA TYPE: ICD-10-CM

## 2020-11-06 DIAGNOSIS — Z79.4 DIABETES MELLITUS, TYPE II, INSULIN DEPENDENT (HCC): Primary | ICD-10-CM

## 2020-11-06 DIAGNOSIS — I10 ESSENTIAL HYPERTENSION WITH GOAL BLOOD PRESSURE LESS THAN 130/80: ICD-10-CM

## 2020-11-06 LAB — URATE SERPL-MCNC: 7.1 MG/DL (ref 2.6–7.2)

## 2020-11-06 PROCEDURE — 99214 OFFICE O/P EST MOD 30 MIN: CPT | Performed by: FAMILY MEDICINE

## 2020-11-06 PROCEDURE — 84550 ASSAY OF BLOOD/URIC ACID: CPT

## 2020-11-06 PROCEDURE — 36415 COLL VENOUS BLD VENIPUNCTURE: CPT

## 2020-11-06 RX ORDER — FUROSEMIDE 80 MG/1
80 TABLET ORAL 2 TIMES DAILY
Qty: 90 TAB | Refills: 1 | Status: SHIPPED | OUTPATIENT
Start: 2020-11-06 | End: 2021-12-10

## 2020-11-06 RX ORDER — ALLOPURINOL 100 MG/1
200 TABLET ORAL DAILY
Qty: 60 TAB | Refills: 1 | Status: SHIPPED | OUTPATIENT
Start: 2020-11-06 | End: 2020-12-08 | Stop reason: DRUGHIGH

## 2020-11-06 RX ORDER — INSULIN LISPRO 100 [IU]/ML
INJECTION, SOLUTION INTRAVENOUS; SUBCUTANEOUS
COMMUNITY
End: 2020-11-06 | Stop reason: SDUPTHER

## 2020-11-06 RX ORDER — SPIRONOLACTONE 25 MG/1
25 TABLET ORAL DAILY
Qty: 30 TAB | Refills: 1 | Status: SHIPPED | OUTPATIENT
Start: 2020-11-06 | End: 2021-12-10

## 2020-11-06 RX ORDER — INSULIN GLARGINE 100 [IU]/ML
68 INJECTION, SOLUTION SUBCUTANEOUS
Qty: 3 ML | Refills: 3 | Status: SHIPPED | OUTPATIENT
Start: 2020-11-06 | End: 2020-12-08 | Stop reason: SDUPTHER

## 2020-11-06 RX ORDER — CARVEDILOL 6.25 MG/1
TABLET ORAL 2 TIMES DAILY WITH MEALS
COMMUNITY
End: 2020-11-06 | Stop reason: SDUPTHER

## 2020-11-06 RX ORDER — INSULIN LISPRO 100 [IU]/ML
8 INJECTION, SOLUTION INTRAVENOUS; SUBCUTANEOUS
Qty: 1 PACKAGE | Refills: 2 | Status: SHIPPED | OUTPATIENT
Start: 2020-11-06 | End: 2020-12-08 | Stop reason: SDUPTHER

## 2020-11-06 RX ORDER — ATORVASTATIN CALCIUM 80 MG/1
80 TABLET, FILM COATED ORAL DAILY
Qty: 90 TAB | Refills: 1 | Status: SHIPPED | OUTPATIENT
Start: 2020-11-06 | End: 2021-12-10 | Stop reason: SDUPTHER

## 2020-11-06 RX ORDER — FUROSEMIDE 80 MG/1
80 TABLET ORAL 2 TIMES DAILY
COMMUNITY
Start: 2020-10-13 | End: 2020-11-06 | Stop reason: SDUPTHER

## 2020-11-06 RX ORDER — CARVEDILOL 6.25 MG/1
6.25 TABLET ORAL 2 TIMES DAILY WITH MEALS
Qty: 180 TAB | Refills: 1 | Status: SHIPPED | OUTPATIENT
Start: 2020-11-06

## 2020-11-06 RX ORDER — INSULIN LISPRO 100 [IU]/ML
8 INJECTION, SOLUTION INTRAVENOUS; SUBCUTANEOUS
COMMUNITY
End: 2020-11-06 | Stop reason: SDUPTHER

## 2020-11-06 NOTE — PROGRESS NOTES
1. Have you been to the ER, urgent care clinic since your last visit? Hospitalized since your last visit? Yes 9-29-20 to 10-13-20 Monika Backers for CHF    2. Have you seen or consulted any other health care providers outside of the [address] [place] [address] since your last visit? Include any pap smears or colon screening.  No

## 2020-11-06 NOTE — PROGRESS NOTES
[facility]    CC: F/U for Chronic Disease Management    HPI:     Patient did not do any hospital follow-up visit and has been taking several of her medications incorrectly.       DMII:  -Taking 68 units of Lantus nightly and 8 units TID AC of Lispro  -Denies any side effects or issue with her medication regimen  -Has been checking her blood sugar at home, but no log brought in for review  -Diet is unchanged from last visit  -Not following any regular exercise regimen      Hyperuricemia:  -Taking allopurinol as prescribed  -Denies any side effects or issues with the medication  -Diet is unchanged from last visit  -Denies any history of gout flares      HTN:  -Amlodipine discontinued and prior beta-blocker and diuretic regimen changed at recent hospitalization for CHF exacerbation  -Of note, has not been taking metolazone  -Denies any side effects or issues with BP medication  -Does not check blood pressure at home  -Not following any regular exercise regimen  -Diet is unchanged since last visit      HLD:  -Has been taking 120 mg of atorvastatin (Had old dosage refilled by hospital and new dosage by our office)  -Denies any side effects or issues with the medication  -Not following any regular exercise regimen  -Diet is unchanged since last visit      ROS: Positive items marked in RED  CON: fever, chills  Cardiovascular: palpitations, CP  Resp: SOB, cough  GI: nausea, vomiting, diarrhea  : dysuria, hematuria      Past Medical History:   Diagnosis Date    Cataracts, bilateral     Chronic kidney disease, stage 3     Diabetes mellitus, type II ([address]     Diastolic congestive heart failure ([address] 2017    Stage C, NYHA class IIIa    Fatty liver     Gout     HLD (hyperlipidemia)     Hypertension     Iron deficiency anemia 4/13/2019    Morbid obesity ([address]     Retinopathy     Stasis dermatitis of both legs        Past Surgical History:   Procedure Laterality Date    HX WISDOM TEETH EXTRACTION Family History   Problem Relation Age of Onset    Diabetes Mother     No Known Problems Father        Social History     Tobacco Use    Smoking status: Never Smoker    Smokeless tobacco: Never Used   Substance Use Topics    Alcohol use: Not Currently    Drug use: Never       No Known Allergies      Current Outpatient Medications:     furosemide (LASIX) 80 mg tablet, Take 80 mg by mouth two (2) times a day., Disp: , Rfl:     carvediloL (COREG) 6.25 mg tablet, Take  by mouth two (2) times daily (with meals). , Disp: , Rfl:     insulin glargine (Lantus Solostar U-100 Insulin) 100 unit/mL (3 mL) inpn, 63 Units by SubCUTAneous route nightly. (Patient taking differently: 68 Units by SubCUTAneous route nightly.), Disp: 15 mL, Rfl: 2    amLODIPine (NORVASC) 10 mg tablet, Take 1 tablet by mouth once daily, Disp: 90 Tab, Rfl: 0    DULoxetine (CYMBALTA) 60 mg capsule, TAKE 1 CAPSULE BY MOUTH ONCE DAILY FOR  DIABETIC  COMPLICATIONS  CAUSING  INJURY  TO  SOME  BODY  NERVES, Disp: 30 Cap, Rfl: 0    allopurinoL (ZYLOPRIM) 100 mg tablet, Take 2 Tabs by mouth daily. , Disp: 60 Tab, Rfl: 2    hydrALAZINE (APRESOLINE) 100 mg tablet, Take 1 Tab by mouth three (3) times daily. , Disp: 270 Tab, Rfl: 1    atorvastatin (LIPITOR) 80 mg tablet, Take 1 Tab by mouth daily. , Disp: 90 Tab, Rfl: 1    Insulin Needles, Disposable, (ReliOn Pen Needles) 32 gauge x 5/32\" ndle, Use 1 pen needle to inject beneath the skin twice daily, Disp: 200 Pen Needle, Rfl: 3    linaGLIPtin (TRADJENTA) 5 mg tablet, Take 1 Tab by mouth daily. , Disp: 90 Tab, Rfl: 2    aspirin 81 mg chewable tablet, Take 81 mg by mouth daily. , Disp: , Rfl:     spironolactone (ALDACTONE) 25 mg tablet, Take  by mouth daily. , Disp: , Rfl:     metoprolol succinate (TOPROL-XL) 200 mg XL tablet, Take 1 Tab by mouth daily. , Disp: 90 Tab, Rfl: 1    bumetanide (BUMEX) 1 mg tablet, Take 2 tablets by mouth twice daily, Disp: 60 Tab, Rfl: 0    ferrous sulfate 325 mg (65 mg iron) tablet, Take  by mouth Daily (before breakfast). , Disp: , Rfl:     metOLazone (ZAROXOLYN) 5 mg tablet, Take  by mouth daily. , Disp: , Rfl:     aspirin (ASPIRIN) 325 mg tablet, Take 325 mg by mouth daily. , Disp: , Rfl:     Physical Exam:      /72   Pulse 79   Temp 97.5 °F (36.4 °C) (Temporal)   Resp 18   Ht 5' 4\" (1.626 m)   Wt 262 lb (118.8 kg)   SpO2 98%   BMI 44.97 kg/m²     General: obese habitus, NAD, conversant  Eyes: sclera clear bilaterally, no discharge noted, eyelids normal in appearance  HENT: NCAT  Lungs: CTAB, normal respiratory effort and rate  CV: RRR, no MRGs  ABD: soft, non-tender, non-distended, normal bowel sounds  Ext: no significant peripheral edema or digital cyanosis noted  Skin: normal temperature, turgor, color, and texture  Psych: alert and oriented to person, place and situation, normal affect  Neuro: speech normal, moving all extremities    Diabetic Foot Exam:   Left Foot:   Visual Exam: normal    Pulse DP: 2+ (normal)   Filament test: normal sensation    Vibratory sensation: normal    Proprioception: normal      Right Foot:   Visual Exam: normal    Pulse DP: 2+ (normal)   Filament test: normal sensation    Vibratory sensation: normal   Proprioception: normal    Results for Bethel Lawrence (MRN [id number]):   Ref.  Range 10/2/2020 00:00   Hemoglobin A1c, External Latest Units: % 9.6       Assessment/Plan     DMII, likely inadequately controlled:  -Current HGBA1c not at goal of <7%  -Ability to adjust current insulin regimen limited due to patient not providing any blood sugar log  -Advised on importance of providing blood sugar log so that adjustments could be made of her insulin  -Follow-up in 1 month      HTN, likely inadequately controlled:   -BP currently not at goal of <130/80  -Likely needs to be resumed on amlodipine or metolazone  -Advised she discuss with cardiology whether they want her to start taking metolazone as prescribed from the hospital on discharge  -Follow-up in 1 month      Hyperuricemia,  likely improved:  -Will continue current allopurinol regimen  -Uric acid level ordered  -Follow-up in 1 month        HLD:  -Discussed with patient that she was not supposed to be taking 120 mg of atorvastatin, but only 80 mg.  It was explained to her that the hospital did not know she was on 80 mg and had refilled her old dosage of her medication  -Will continue previously prescribed regimen of 80 mg of atorvastatin daily  -Follow-up in 1 month        Sandhya Allen MD  11/6/2020, 10:37 AM

## 2020-11-29 VITALS
SYSTOLIC BLOOD PRESSURE: 139 MMHG | OXYGEN SATURATION: 98 % | BODY MASS INDEX: 44.73 KG/M2 | HEART RATE: 79 BPM | TEMPERATURE: 97.5 F | WEIGHT: 262 LBS | RESPIRATION RATE: 18 BRPM | DIASTOLIC BLOOD PRESSURE: 72 MMHG | HEIGHT: 64 IN

## 2020-11-29 PROBLEM — E79.0 HYPERURICEMIA: Status: ACTIVE | Noted: 2020-11-29

## 2020-12-08 ENCOUNTER — OFFICE VISIT (OUTPATIENT)
Dept: FAMILY MEDICINE CLINIC | Age: 60
End: 2020-12-08
Payer: COMMERCIAL

## 2020-12-08 VITALS
HEIGHT: 64 IN | DIASTOLIC BLOOD PRESSURE: 61 MMHG | OXYGEN SATURATION: 98 % | WEIGHT: 271 LBS | HEART RATE: 76 BPM | SYSTOLIC BLOOD PRESSURE: 140 MMHG | BODY MASS INDEX: 46.26 KG/M2 | RESPIRATION RATE: 18 BRPM | TEMPERATURE: 97.5 F

## 2020-12-08 DIAGNOSIS — E79.0 HYPERURICEMIA: ICD-10-CM

## 2020-12-08 DIAGNOSIS — E11.9 DIABETES MELLITUS, TYPE II, INSULIN DEPENDENT (HCC): Primary | ICD-10-CM

## 2020-12-08 DIAGNOSIS — I10 ESSENTIAL HYPERTENSION WITH GOAL BLOOD PRESSURE LESS THAN 130/80: ICD-10-CM

## 2020-12-08 DIAGNOSIS — Z79.4 DIABETES MELLITUS, TYPE II, INSULIN DEPENDENT (HCC): Primary | ICD-10-CM

## 2020-12-08 PROCEDURE — 99214 OFFICE O/P EST MOD 30 MIN: CPT | Performed by: FAMILY MEDICINE

## 2020-12-08 RX ORDER — ALLOPURINOL 300 MG/1
300 TABLET ORAL DAILY
Qty: 90 TAB | Refills: 1 | Status: SHIPPED | OUTPATIENT
Start: 2020-12-08

## 2020-12-08 RX ORDER — INSULIN GLARGINE 100 [IU]/ML
71 INJECTION, SOLUTION SUBCUTANEOUS
Qty: 4 PEN | Refills: 2 | Status: SHIPPED | OUTPATIENT
Start: 2020-12-08 | End: 2021-09-03

## 2020-12-08 RX ORDER — INSULIN LISPRO 100 [IU]/ML
8 INJECTION, SOLUTION INTRAVENOUS; SUBCUTANEOUS
Qty: 1 PACKAGE | Refills: 3 | Status: SHIPPED | OUTPATIENT
Start: 2020-12-08 | End: 2021-08-27 | Stop reason: SDUPTHER

## 2020-12-08 NOTE — PATIENT INSTRUCTIONS
You have an appointment with [facility] at 9:15 AM at [address] [facility], [address] [facility].  [address]

## 2020-12-08 NOTE — PROGRESS NOTES
1. Have you been to the ER, urgent care clinic since your last visit? Hospitalized since your last visit? No    2. Have you seen or consulted any other health care providers outside of the [address] [place] [address] since your last visit? Include any pap smears or colon screening.  Yes Was seen by Dermatologist

## 2020-12-08 NOTE — PROGRESS NOTES
[facility]    CC: F/U for Chronic Disease Management    HPI:       DMII:   -Taking her diabetic medication as prescribed  -Denies any side effects or issue with the medication regimen  -Has been checking her blood sugar at home. No log brought in for review. -Reports her FBS has been in the 170s-180s  -Diet is unchanged from her last visit  -Not following any regular exercise regimen       HTN:   -Taking BP medication  -Denies any side effects or issues with BP medication  -Does not check blood pressure at home  -Diet is unchanged from her last visit  -Not following any regular exercise regimen  -Has not seen cardiologist as discussed at last visit      Hyperuricemia:  -Got requested lab work  -Taking allopurinol as prescribed  -Denies any side effects or issue with the medication regimen  -Diet is unchanged since her last visit      ROS: Positive items marked in RED  CON: fever, chills  Cardiovascular: palpitations, CP  Resp: SOB, cough  GI: nausea, vomiting, diarrhea  : dysuria, hematuria      Past Medical History:   Diagnosis Date    Cataracts, bilateral     Chronic kidney disease, stage 3     Diabetes mellitus, type II ([address]     Diastolic congestive heart failure ([address] 2017    Stage C, NYHA class IIIa    Fatty liver     Gout     HLD (hyperlipidemia)     Hypertension     Iron deficiency anemia 4/13/2019    Morbid obesity ([address]     Retinopathy     Stasis dermatitis of both legs        Past Surgical History:   Procedure Laterality Date    HX WISDOM TEETH EXTRACTION         Family History   Problem Relation Age of Onset    Diabetes Mother     No Known Problems Father        Social History     Tobacco Use    Smoking status: Never Smoker    Smokeless tobacco: Never Used   Substance Use Topics    Alcohol use: Not Currently    Drug use: Never       No Known Allergies      Current Outpatient Medications:     allopurinoL (ZYLOPRIM) 100 mg tablet, Take 2 Tabs by mouth daily. , Disp: 60 Tab, Rfl: 1    atorvastatin (LIPITOR) 80 mg tablet, Take 1 Tab by mouth daily. , Disp: 90 Tab, Rfl: 1    carvediloL (COREG) 6.25 mg tablet, Take 1 Tab by mouth two (2) times daily (with meals). , Disp: 180 Tab, Rfl: 1    furosemide (LASIX) 80 mg tablet, Take 1 Tab by mouth two (2) times a day., Disp: 90 Tab, Rfl: 1    insulin glargine (Lantus Solostar U-100 Insulin) 100 unit/mL (3 mL) inpn, 68 Units by SubCUTAneous route nightly., Disp: 3 mL, Rfl: 3    insulin lispro (HUMALOG) 100 unit/mL kwikpen, 8 Units by SubCUTAneous route Before breakfast, lunch, and dinner., Disp: 1 Package, Rfl: 2    linaGLIPtin (TRADJENTA) 5 mg tablet, Take 1 Tab by mouth daily. , Disp: 90 Tab, Rfl: 2    spironolactone (ALDACTONE) 25 mg tablet, Take 1 Tab by mouth daily. , Disp: 30 Tab, Rfl: 1    hydrALAZINE (APRESOLINE) 100 mg tablet, Take 1 Tab by mouth three (3) times daily. , Disp: 270 Tab, Rfl: 1    Insulin Needles, Disposable, (ReliOn Pen Needles) 32 gauge x 5/32\" ndle, Use 1 pen needle to inject beneath the skin twice daily, Disp: 200 Pen Needle, Rfl: 3    aspirin 81 mg chewable tablet, Take 81 mg by mouth daily. , Disp: , Rfl:     Physical Exam:      BP (!) 140/61   Pulse 76   Temp 97.5 °F (36.4 °C) (Temporal)   Resp 18   Ht 5' 4\" (1.626 m)   Wt 271 lb (122.9 kg)   SpO2 98%   BMI 46.52 kg/m²     General: obese habitus, NAD, conversant  Eyes: sclera clear bilaterally, no discharge noted, eyelids normal in appearance  HENT: NCAT  Lungs: CTAB, normal respiratory effort and rate  CV: RRR, no MRGs  ABD: soft, non-tender, non-distended, normal bowel sounds  Ext: no peripheral edema or digital cyanosis noted  Skin: normal temperature, turgor, color, and texture  Psych: alert and oriented to person, place and situation, normal affect  Neuro: speech normal, moving all extremities    Results for Brooke Daly (MRN [id number]):   Ref.  Range 11/6/2020 11:43   Uric acid Latest Ref Range: 2.6 - 7.2 MG/DL 7.1       Assessment/Plan DMII, inadequately controlled:  -Reported FBS not at goal of <130  -Lantus dose increased to 71 units  -Instructed on insulin titration  -Follow-up in 1 month to establish care with new PCP        HTN, inadequately controlled:   -BP currently not at goal of <130/80  -Will defer adjusting current BP regimen to cardiology given history of low diastolic BP  -Follow-up in 1 month to establish care with new PCP       Hyperuricemia, inadequately controlled:  -Uric acid level not at goal of <6  -Allopurinol dose increased to 300 mg  -Follow-up in 1 month to establish care with new PCP      Seamus Arnett MD  12/8/2020, 10:41 AM

## 2021-08-27 DIAGNOSIS — E11.9 DIABETES MELLITUS, TYPE II, INSULIN DEPENDENT (HCC): ICD-10-CM

## 2021-08-27 DIAGNOSIS — Z79.4 DIABETES MELLITUS, TYPE II, INSULIN DEPENDENT (HCC): ICD-10-CM

## 2021-08-27 RX ORDER — INSULIN LISPRO 100 [IU]/ML
8 INJECTION, SOLUTION INTRAVENOUS; SUBCUTANEOUS
Qty: 1 PACKAGE | Refills: 0 | Status: SHIPPED | OUTPATIENT
Start: 2021-08-27 | End: 2021-09-03

## 2021-08-27 NOTE — TELEPHONE ENCOUNTER
Leida lee  Patient Medical Advice Request Pool 7 minutes ago (9:03 AM)     Good morning, I need to get a refill on insulin lispro 100 unit/mL kwikpen. I have been without my medication for 3 weeks. Jesusita Vasquez is unable to refill prescription and I doctor appointment is not until another month. I need to get this refilled . Im unable to manage my insulin.

## 2021-09-03 DIAGNOSIS — Z79.4 DIABETES MELLITUS, TYPE II, INSULIN DEPENDENT (HCC): Primary | ICD-10-CM

## 2021-09-03 DIAGNOSIS — E11.9 DIABETES MELLITUS, TYPE II, INSULIN DEPENDENT (HCC): Primary | ICD-10-CM

## 2021-09-03 PROBLEM — M54.50 CHRONIC MIDLINE LOW BACK PAIN WITHOUT SCIATICA: Status: ACTIVE | Noted: 2018-07-17

## 2021-09-03 PROBLEM — G89.29 CHRONIC MIDLINE LOW BACK PAIN WITHOUT SCIATICA: Status: ACTIVE | Noted: 2018-07-17

## 2021-09-03 PROBLEM — J96.02 ACUTE HYPERCAPNIC RESPIRATORY FAILURE (HCC): Status: ACTIVE | Noted: 2019-09-17

## 2021-09-03 PROBLEM — E66.01 MORBID OBESITY WITH BODY MASS INDEX OF 45.0-49.9 IN ADULT (HCC): Status: ACTIVE | Noted: 2019-09-13

## 2021-09-03 PROBLEM — G89.29 CHRONIC MIDLINE THORACIC BACK PAIN: Status: ACTIVE | Noted: 2018-07-17

## 2021-09-03 PROBLEM — D63.8 ANEMIA OF CHRONIC DISEASE: Status: ACTIVE | Noted: 2020-09-30

## 2021-09-03 PROBLEM — M54.6 CHRONIC MIDLINE THORACIC BACK PAIN: Status: ACTIVE | Noted: 2018-07-17

## 2021-09-03 PROBLEM — H43.11 VITREOUS HEMORRHAGE, RIGHT (HCC): Status: ACTIVE | Noted: 2018-07-17

## 2021-09-03 PROBLEM — R93.89 ABNORMAL CHEST CT: Status: ACTIVE | Noted: 2017-12-11

## 2021-09-03 PROBLEM — I50.9 CHF EXACERBATION (HCC): Status: ACTIVE | Noted: 2020-09-29

## 2021-09-03 RX ORDER — INSULIN GLARGINE 100 [IU]/ML
INJECTION, SOLUTION SUBCUTANEOUS
Qty: 1 PEN | Refills: 3 | Status: SHIPPED | OUTPATIENT
Start: 2021-09-03 | End: 2021-12-10 | Stop reason: SDUPTHER

## 2021-09-03 RX ORDER — INSULIN ASPART 100 [IU]/ML
8 INJECTION, SOLUTION INTRAVENOUS; SUBCUTANEOUS
Qty: 1 ADJUSTABLE DOSE PRE-FILLED PEN SYRINGE | Refills: 1 | Status: SHIPPED | OUTPATIENT
Start: 2021-09-03 | End: 2021-12-10 | Stop reason: SDUPTHER

## 2021-09-03 RX ORDER — TRIAMCINOLONE ACETONIDE 1 MG/G
OINTMENT TOPICAL
COMMUNITY
Start: 2021-07-19

## 2021-09-03 RX ORDER — DUREZOL 0.5 MG/ML
1 EMULSION OPHTHALMIC DAILY
COMMUNITY
End: 2021-12-10

## 2021-09-03 RX ORDER — METOLAZONE 5 MG/1
5 TABLET ORAL 2 TIMES DAILY
COMMUNITY
Start: 2020-10-13

## 2021-09-07 DIAGNOSIS — E11.9 DIABETES MELLITUS, TYPE II, INSULIN DEPENDENT (HCC): ICD-10-CM

## 2021-09-07 DIAGNOSIS — Z79.4 DIABETES MELLITUS, TYPE II, INSULIN DEPENDENT (HCC): ICD-10-CM

## 2021-09-07 RX ORDER — PEN NEEDLE, DIABETIC 31 GX3/16"
NEEDLE, DISPOSABLE MISCELLANEOUS
Qty: 200 PEN NEEDLE | Refills: 3 | Status: SHIPPED | OUTPATIENT
Start: 2021-09-07

## 2021-09-30 ENCOUNTER — OFFICE VISIT (OUTPATIENT)
Dept: ORTHOPEDIC SURGERY | Age: 61
End: 2021-09-30
Payer: COMMERCIAL

## 2021-09-30 VITALS
BODY MASS INDEX: 43.02 KG/M2 | HEIGHT: 64 IN | WEIGHT: 252 LBS | OXYGEN SATURATION: 99 % | HEART RATE: 69 BPM | TEMPERATURE: 97.1 F

## 2021-09-30 DIAGNOSIS — L03.90 CELLULITIS, UNSPECIFIED CELLULITIS SITE: ICD-10-CM

## 2021-09-30 DIAGNOSIS — Z91.81 AT RISK FOR FALLS: ICD-10-CM

## 2021-09-30 DIAGNOSIS — M17.11 PRIMARY OSTEOARTHRITIS OF RIGHT KNEE: Primary | ICD-10-CM

## 2021-09-30 DIAGNOSIS — M25.561 ACUTE PAIN OF RIGHT KNEE: ICD-10-CM

## 2021-09-30 DIAGNOSIS — M85.88 OSTEOPENIA OF OTHER SITE: ICD-10-CM

## 2021-09-30 DIAGNOSIS — M17.12 PRIMARY OSTEOARTHRITIS OF LEFT KNEE: ICD-10-CM

## 2021-09-30 DIAGNOSIS — M25.562 ACUTE PAIN OF LEFT KNEE: ICD-10-CM

## 2021-09-30 DIAGNOSIS — I87.8 VENOUS STASIS: ICD-10-CM

## 2021-09-30 PROCEDURE — 99203 OFFICE O/P NEW LOW 30 MIN: CPT | Performed by: SPECIALIST

## 2021-09-30 PROCEDURE — 20610 DRAIN/INJ JOINT/BURSA W/O US: CPT | Performed by: SPECIALIST

## 2021-09-30 RX ORDER — BETAMETHASONE SODIUM PHOSPHATE AND BETAMETHASONE ACETATE 3; 3 MG/ML; MG/ML
6 INJECTION, SUSPENSION INTRA-ARTICULAR; INTRALESIONAL; INTRAMUSCULAR; SOFT TISSUE ONCE
Status: COMPLETED | OUTPATIENT
Start: 2021-09-30 | End: 2021-09-30

## 2021-09-30 RX ADMIN — BETAMETHASONE SODIUM PHOSPHATE AND BETAMETHASONE ACETATE 6 MG: 3; 3 INJECTION, SUSPENSION INTRA-ARTICULAR; INTRALESIONAL; INTRAMUSCULAR; SOFT TISSUE at 14:54

## 2021-09-30 NOTE — PROGRESS NOTES
Patient: Tennille Nguyen                MRN: [id number]       SSN: [id number]  YOB: 1960        AGE: 59 y.o. SEX: female    PCP: Sandra, Not On File, MD  09/30/21    Chief Complaint   Patient presents with    Knee Pain     Bilat     HISTORY:  Tennille Nguyen is a 59 y.o. female who is seen for bilateral knee pain R>L. She has been experiencing increased knee pain for the past 3 weeks. However, she experiencing low grade chronic knee pain for 7+ years. A previous cortisone injection helped. She was seen at the [facility] ED on 9/24/21 where right knee x-rays revealed severe medial osteoarthritis. She was referred for orthopaedic evaluation. She feels pain with standing, walking and stair climbing. She experiences startup pain after sitting. She states that she has a recent h/o shingles affecting her neck and upper shoulders. She had one shingles vaccine. She has had trouble lifting her left arm. She is being treated by her PCP. She is seeing a neurologist next week. She was supposed to undergo eye surgery but she is waiting for neurology consult. She has been in the hospital several times during the past few years. She has a h/o chronic CHF. Pain Assessment  9/30/2021   Location Modifiers Left;Right   Severity of Pain 9   Quality of Pain Aching   Frequency of Pain Constant   Aggravating Factors Walking;Standing   Limiting Behavior No   Relieving Factors Rest   Result of Injury No     Occupation, etc:  Ms. Cinthia Garcia receives social security disability benefits for CHF and diabetes. She worked as a  at [employer]. She lives with her 38 yo daughter and  in [place]. She also has a son. Ms. Cinthia Garcia weighs 252 lbs and is 5'4\" tall. She is diabetic. She is accompanied by her  for today's visit. She has not been vaccinated for COVID-19.     Lab Results   Component Value Date/Time    Hemoglobin A1c (POC) 9.7 06/09/2020 03:37 PM    Hemoglobin A1c, External 9.6 10/02/2020 12:00 AM     Weight Metrics 9/30/2021 12/8/2020 11/6/2020 7/14/2020 6/9/2020 5/21/2020 3/9/2020   Weight 252 lb 271 lb 262 lb 248 lb 3.2 oz 249 lb 249 lb 249 lb   BMI 43.26 kg/m2 46.52 kg/m2 44.97 kg/m2 42.6 kg/m2 42.74 kg/m2 42.74 kg/m2 42.74 kg/m2       Patient Active Problem List   Diagnosis Code    Iron deficiency anemia D50.9    Diabetes mellitus, type II, insulin dependent ([facility]) E11.9, Z79.4    Essential hypertension with goal blood pressure less than 130/80 I10    CKD (chronic kidney disease) stage 3, GFR 30-59 ml/min ([facility]) N18.30    Hyperlipidemia E78.5    Chronic diastolic heart failure ([facility]) I50.32    Obesity, morbid ([facility]) E66.01    Hyperuricemia E79.0    Abnormal chest CT R93.89    Acquired trigger finger M65.30    Acute hypercapnic respiratory failure ([facility]) J96.02    Anemia of chronic disease D63.8    CHF exacerbation ([facility]) I50.9    Chronic midline low back pain without sciatica M54.5, G89.29    Chronic midline thoracic back pain M54.6, G89.29    Morbid obesity with body mass index of 45.0-49.9 in adult ([facility]) E66.01, Z68.42    Proteinuria R80.9    Vitreous hemorrhage, right ([facility]) H43.11     REVIEW OF SYSTEMS:    Constitutional Symptoms: Negative   Eyes: Negative   Ears, Nose, Throat and Mouth: Negative   Cardiovascular: Negative   Respiratory: Negative   Genitourinary: Per HPI   Gastrointestinal: Per HPI   Integumentary (Skin and/or Breast): Negative   Musculoskeletal: Per HPI   Endocrine/Rheumatologic: Negative   Neurological: Per HPI   Hematology/Lymphatic: Negative    Allergic/Immunologic: Negative   Phychiatric: Negative    Social History     Socioeconomic History    Marital status: UNKNOWN     Spouse name: Not on file    Number of children: Not on file    Years of education: Not on file    Highest education level: Not on file   Occupational History    Not on file   Tobacco Use    Smoking status: Never Smoker    Smokeless tobacco: Never Used   Substance and Sexual Activity    Alcohol use: Not Currently    Drug use: Never    Sexual activity: Not Currently   Other Topics Concern    Not on file   Social History Narrative    Not on file     Social Determinants of Health     Financial Resource Strain:     Difficulty of Paying Living Expenses:    Food Insecurity:     Worried About Running Out of Food in the Last Year:     [address] in the Last Year:    Transportation Needs:     Lack of Transportation (Medical):  Lack of Transportation (Non-Medical):    Physical Activity:     Days of Exercise per Week:     Minutes of Exercise per Session:    Stress:     Feeling of Stress :    Social Connections:     Frequency of Communication with Friends and Family:     Frequency of Social Gatherings with Friends and Family:     Attends Rastafarian Services:     Active Member of Clubs or Organizations:     Attends Club or Organization Meetings:     Marital Status:    Intimate Partner Violence:     Fear of Current or Ex-Partner:     Emotionally Abused:     Physically Abused:     Sexually Abused:       No Known Allergies   Current Outpatient Medications   Medication Sig    Insulin Needles, Disposable, (ReliOn Pen Needles) 32 gauge x 5/32\" ndle Use 1 pen needle to inject beneath the skin twice daily    linaGLIPtin (TRADJENTA) 5 mg tablet Take 1 Tablet by mouth daily.  difluprednate (DurezoL) 0.05 % ophthalmic emulsion Apply 1 Drop to eye daily.  metOLazone (ZAROXOLYN) 5 mg tablet Take 5 mg by mouth two (2) times a day.  triamcinolone acetonide (KENALOG) 0.1 % ointment APPLY 1 APPLICATION OF OINTMENT TOPICALLY TO AFFECTED AREA OF THE ARMS AND LEGS TWICE DAILY    insulin glargine (LANTUS,BASAGLAR) 100 unit/mL (3 mL) inpn Inject 68 units by subcutaneous route nightly.  insulin aspart U-100 (NOVOLOG) 100 unit/mL (3 mL) inpn 8 Units by SubCUTAneous route Before breakfast, lunch, and dinner.  Indications: type 2 diabetes mellitus    allopurinoL (ZYLOPRIM) 300 mg tablet Take 1 Tab by mouth daily.    atorvastatin (LIPITOR) 80 mg tablet Take 1 Tab by mouth daily.  carvediloL (COREG) 6.25 mg tablet Take 1 Tab by mouth two (2) times daily (with meals).  furosemide (LASIX) 80 mg tablet Take 1 Tab by mouth two (2) times a day.  spironolactone (ALDACTONE) 25 mg tablet Take 1 Tab by mouth daily.  hydrALAZINE (APRESOLINE) 100 mg tablet Take 1 Tab by mouth three (3) times daily.  aspirin 81 mg chewable tablet Take 81 mg by mouth daily. No current facility-administered medications for this visit.       PHYSICAL EXAMINATION:  Visit Vitals  Pulse 69   Temp 97.1 °F (36.2 °C) (Temporal)   Ht 5' 4\" (1.626 m)   Wt 252 lb (114.3 kg)   SpO2 99%   BMI 43.26 kg/m²      ORTHO EXAMINATION:  Examination Right knee Left knee   Skin Intact, venous stasis, diabetic ulcers Intact, venous stasis, diabetic ulcers   Range of motion 120-0 120-0   Effusion - -   Medial joint line tenderness + +   Lateral joint line tenderness - -   Popliteal tenderness - -   Osteophytes palpable + +   Matts - -   Patella crepitus + +   Anterior drawer - -   Lateral laxity - -   Medial laxity - -   Varus deformity + -   Valgus deformity - -   Pretibial edema +++ +++   Calf tenderness - -          TIME OUT:  Chart reviewed for the following:   I, Arturo Simmons MD, have reviewed the History, Physical and updated the Allergic reactions for [address] performed immediately prior to start of procedure:  Saúl Harding MD, have performed the following reviews on Amanda Armando prior to the start of the procedure:          * Patient was identified by name and date of birth   * Agreement on procedure being performed was verified  * Risks and Benefits explained to the patient  * Procedure site verified and marked as necessary  * Patient was positioned for comfort  * Consent was obtained     Time: 2:44 PM     Date of procedure: 9/30/2021  Procedure performed by:  Arturo Simmons MD  Ms. Rico Fish tolerated the procedure well with no complications. RADIOGRAPHS:  XR RIGHT KNEE 9/30/21 [facility]  Impression  Worsened moderate to severe narrowing medial compartment right knee. Narrowing patellofemoral joint space. Patellar enthesophytes. Small joint effusion. Generalized anasarca.   -I have independently reviewed these images during this office visit. -Dr. Bunn Dk:  Three views with bilateral knees on AP view - No fractures, no effusion, severe joint space narrowing, + osteophytes present. Kellgren Maximus grade 4, varus deformity, osteopenia. IMPRESSION:      ICD-10-CM ICD-9-CM    1. Primary osteoarthritis of right knee  M17.11 715.16 betamethasone (CELESTONE) injection 6 mg      OK DRAIN/INJECT LARGE JOINT/BURSA      PROCEDURE AUTHORIZATION TO    2. Acute pain of right knee  M25.561 719.46    3. Osteopenia of other site  M85.88 733.90    4. At risk for falls  Z91.81 V15.88    5. Primary osteoarthritis of left knee  M17.12 715.16 betamethasone (CELESTONE) injection 6 mg      OK DRAIN/INJECT LARGE JOINT/BURSA      PROCEDURE AUTHORIZATION TO    6. Acute pain of left knee  M25.562 719.46    7. Venous stasis  I87.8 459.81    8. Cellulitis, unspecified cellulitis site  L03.90 682.9      PLAN:  Consider visco supplementation if pain continues. After discussing treatment options, patient's knees were injected with 4 cc Marcaine and 1/2 cc Celestone. We discussed possible need for a right knee arthroplasty at some time in the future if pain continues, but her weight and diabetes are relative contraindications. She will follow up as needed.       Scribed by Pamela Meadows ([address]) as dictated by Harvey Shore MD

## 2021-12-09 DIAGNOSIS — E11.9 DIABETES MELLITUS, TYPE II, INSULIN DEPENDENT (HCC): ICD-10-CM

## 2021-12-09 DIAGNOSIS — Z79.4 DIABETES MELLITUS, TYPE II, INSULIN DEPENDENT (HCC): ICD-10-CM

## 2021-12-09 RX ORDER — INSULIN GLARGINE 100 [IU]/ML
INJECTION, SOLUTION SUBCUTANEOUS
Qty: 2 ADJUSTABLE DOSE PRE-FILLED PEN SYRINGE | Refills: 0 | Status: CANCELLED | OUTPATIENT
Start: 2021-12-09

## 2021-12-09 NOTE — TELEPHONE ENCOUNTER
12/10/2021  3:15 PM [facility] SPECIAL USE CASE     Appointment is to establish. Former Dr. Anali Post patient.

## 2021-12-10 ENCOUNTER — VIRTUAL VISIT (OUTPATIENT)
Dept: FAMILY MEDICINE CLINIC | Age: 61
End: 2021-12-10
Payer: COMMERCIAL

## 2021-12-10 DIAGNOSIS — H43.11 VITREOUS HEMORRHAGE, RIGHT (HCC): ICD-10-CM

## 2021-12-10 DIAGNOSIS — Z79.4 DIABETES MELLITUS, TYPE II, INSULIN DEPENDENT (HCC): ICD-10-CM

## 2021-12-10 DIAGNOSIS — I50.32 CHRONIC DIASTOLIC HEART FAILURE (HCC): ICD-10-CM

## 2021-12-10 DIAGNOSIS — I10 ESSENTIAL HYPERTENSION WITH GOAL BLOOD PRESSURE LESS THAN 130/80: Primary | ICD-10-CM

## 2021-12-10 DIAGNOSIS — E66.01 OBESITY, MORBID (HCC): ICD-10-CM

## 2021-12-10 DIAGNOSIS — E11.9 DIABETES MELLITUS, TYPE II, INSULIN DEPENDENT (HCC): ICD-10-CM

## 2021-12-10 DIAGNOSIS — N18.32 STAGE 3B CHRONIC KIDNEY DISEASE (HCC): ICD-10-CM

## 2021-12-10 DIAGNOSIS — E78.5 HYPERLIPIDEMIA, UNSPECIFIED HYPERLIPIDEMIA TYPE: ICD-10-CM

## 2021-12-10 PROBLEM — I50.9 CHF EXACERBATION (HCC): Status: RESOLVED | Noted: 2020-09-29 | Resolved: 2021-12-10

## 2021-12-10 PROBLEM — G95.9 MYELOPATHY OF CERVICAL SPINAL CORD WITH CERVICAL RADICULOPATHY (HCC): Status: ACTIVE | Noted: 2021-10-21

## 2021-12-10 PROBLEM — M54.12 MYELOPATHY OF CERVICAL SPINAL CORD WITH CERVICAL RADICULOPATHY (HCC): Status: ACTIVE | Noted: 2021-10-21

## 2021-12-10 PROBLEM — E11.42 DIABETIC POLYNEUROPATHY ASSOCIATED WITH TYPE 2 DIABETES MELLITUS (HCC): Status: ACTIVE | Noted: 2021-12-10

## 2021-12-10 PROBLEM — J96.02 ACUTE HYPERCAPNIC RESPIRATORY FAILURE (HCC): Status: RESOLVED | Noted: 2019-09-17 | Resolved: 2021-12-10

## 2021-12-10 PROCEDURE — 99214 OFFICE O/P EST MOD 30 MIN: CPT | Performed by: NURSE PRACTITIONER

## 2021-12-10 RX ORDER — INSULIN ASPART 100 [IU]/ML
8 INJECTION, SOLUTION INTRAVENOUS; SUBCUTANEOUS
Qty: 1 ADJUSTABLE DOSE PRE-FILLED PEN SYRINGE | Refills: 1 | Status: SHIPPED | OUTPATIENT
Start: 2021-12-10 | End: 2021-12-14 | Stop reason: CLARIF

## 2021-12-10 RX ORDER — FUROSEMIDE 80 MG/1
40 TABLET ORAL 2 TIMES DAILY
Qty: 90 TABLET | Refills: 1
Start: 2021-12-10

## 2021-12-10 RX ORDER — INSULIN GLARGINE 100 [IU]/ML
INJECTION, SOLUTION SUBCUTANEOUS
Qty: 1 PEN | Refills: 3 | Status: SHIPPED | OUTPATIENT
Start: 2021-12-10 | End: 2022-01-05 | Stop reason: SDUPTHER

## 2021-12-10 RX ORDER — ATORVASTATIN CALCIUM 80 MG/1
80 TABLET, FILM COATED ORAL DAILY
Qty: 90 TABLET | Refills: 1 | Status: SHIPPED | OUTPATIENT
Start: 2021-12-10

## 2021-12-10 NOTE — PROGRESS NOTES
[address] [place] [address]               [phone]      Kalli Mora is a 59 y.o. female who was seen by synchronous (real-time) audio-video technology on 12/10/2021. Consent: Kalli Mora, who was seen by synchronous (real-time) audio-video technology, and/or her healthcare decision maker, is aware that this patient-initiated, Telehealth encounter on 12/10/2021 is a billable service, with coverage as determined by her insurance carrier. She is aware that she may receive a bill and has provided verbal consent to proceed: Yes. Assessment & Plan:   Diagnoses and all orders for this visit:    1. Essential hypertension with goal blood pressure less than 130/80  -     furosemide (LASIX) 80 mg tablet; Take 0.5 Tablets by mouth two (2) times a day. Endorses medication compliance, Refill provided and she will be in the office next week for preop clearance, will check her b/p at that time   2. Diabetes mellitus, type II, insulin dependent (HCC)  -     insulin glargine (LANTUS,BASAGLAR) 100 unit/mL (3 mL) inpn; Inject 68 units by subcutaneous route nightly. Endorses medication compliance, Refill provided and f/u a1c with next visit   3. Hyperlipidemia, unspecified hyperlipidemia type  -     atorvastatin (LIPITOR) 80 mg tablet; Take 1 Tablet by mouth daily. Endorses medication compliance, Refill provided and Denies abdominal pain or symptoms of myalgia   4. Chronic diastolic heart failure (HCC)  Assessment & Plan:   monitored by specialist. No acute findings meriting change in the plan    Orders:  -     furosemide (LASIX) 80 mg tablet; Take 0.5 Tablets by mouth two (2) times a day. 5. Stage 3b chronic kidney disease ([address]  Assessment & Plan:   monitored by specialist. No acute findings meriting change in the plan      6. Obesity, morbid ([address]  Assessment & Plan:   unclear control, continue current medications, lifestyle modifications recommended      7.  Vitreous hemorrhage, right [facility])  Assessment & Plan:   monitored by specialist. No acute findings meriting change in the plan      Follow-up and Dispositions    · Return in about 3 months (around 3/10/2022) for DM, HLD, HTN, lymphedema, 30 min, office only. 712  Subjective:     Health Maintenance Due   Topic Date Due    COVID-19 Vaccine (1) Never done    Cervical cancer screen  Never done    Colorectal Cancer Screening Combo  Never done    Shingrix Vaccine Age 50> (1 of 2) Never done    Breast Cancer Screen Mammogram  Never done    MICROALBUMIN Q1  06/23/2021    Lipid Screen  06/23/2021    DTaP/Tdap/Td series (2 - Td or Tdap) 08/10/2021    Flu Vaccine (1) 09/01/2021    Foot Exam Q1  11/29/2021             Keyanna Ernst is a 59 y.o. female who was seen for   Establish Care  History of Chronic diastolc CHF, EF 65%- Cardiologist is [address] [place] [address]      Hospitalized in 10/2021  Symptomatic cervical spinal stenosis with myelopathy, improved with a course of medrol  Dc with home PT/OT and neurology follow up  Neurologist is Niles Lott, has appointment 12/17/21  Home health is personal touch. Last blood pressure today was 161/65, yesterday it was 145/67      Has type II DM - finger stick blood glucose today is 162  DMII-   Patient reports medication compliance Daily  Diabetic diet compliance most of the time  Patient monitors blood sugars regularly Daily   Reports am fasting sugars range 162   Denies hypoglycemic episodes yes  Denies polyuria, polydipsia, paraesthesia, vision changes? yes  Engaging in daily exercise?  No     Diabetic Foot and Eye Exam HM Status   Topic Date Due    Diabetic Foot Care  11/29/2021    Eye Exam  03/03/2022     Hemoglobin A1c, External   Date Value Ref Range Status   10/02/2020 9.6 % Final     Comment:     [facility]   ]  Microalbumin/Creat Ratio   Date Value Ref Range Status   06/23/2020 131 (H) <30 mcg/mg creat Final     Comment:        The ADA defines abnormalities in albumin  excretion as follows:     Category         Result (mcg/mg creatinine)     Normal                    <30  Microalbuminuria            Clinical albuminuria   > OR = 300     The ADA recommends that at least two of three  specimens collected within a 3-6 month period be  abnormal before considering a patient to be  within a diagnostic category. Key Antihyperglycemic Medications             insulin glargine (LANTUS,BASAGLAR) 100 unit/mL (3 mL) inpn (Taking) Inject 68 units by subcutaneous route nightly. insulin aspart U-100 (NOVOLOG) 100 unit/mL (3 mL) inpn (Taking/Discontinued) 8 Units by SubCUTAneous route Before breakfast, lunch, and dinner. Indications: type 2 diabetes mellitus    linaGLIPtin (TRADJENTA) 5 mg tablet (Taking) Take 1 Tablet by mouth daily. Essential hypertension  Hypertension:   Patient reports taking medications as instructed. yes   Medication side effects noted. no  Headache upon wakening. no   Home BP monitoring in range of 268/35'G systolic. Do you experience chest pain/pressure or SOB with exertion? no  Maintain a low Sodium diet? yes  Key CAD CHF Meds             atorvastatin (LIPITOR) 80 mg tablet (Taking) Take 1 Tablet by mouth daily. furosemide (LASIX) 80 mg tablet (Taking) Take 0.5 Tablets by mouth two (2) times a day. metOLazone (ZAROXOLYN) 5 mg tablet (Taking) Take 5 mg by mouth two (2) times a day. carvediloL (COREG) 6.25 mg tablet (Taking) Take 1 Tab by mouth two (2) times daily (with meals). hydrALAZINE (APRESOLINE) 100 mg tablet (Taking) Take 1 Tab by mouth three (3) times daily. aspirin 81 mg chewable tablet (Taking) Take 81 mg by mouth daily.         BUN   Date Value Ref Range Status   06/23/2020 41 (H) 7 - 25 mg/dL Final     Creatinine   Date Value Ref Range Status   06/23/2020 1.59 (H) 0.50 - 0.99 mg/dL Final     Comment:     For patients >52years of age, the reference limit  for Creatinine is approximately 13% higher for people  identified as -American. GFR est AA   Date Value Ref Range Status   06/23/2020 40 (L) > OR = 60 mL/min/1.73m2 Final     Potassium   Date Value Ref Range Status   06/23/2020 4.4 3.5 - 5.3 mmol/L Final         Hyperlipidemia  HLD:  Has been compliant with meds  Yes  Compliant with low-fat diet. occasionally    Denies myalgias or other side effects. yes  The 10-year ASCVD risk score (Harper Ventura, et al., 2013) is: 19.2%    Cholesterol, total   Date Value Ref Range Status   06/23/2020 163 <200 mg/dL Final     Triglyceride   Date Value Ref Range Status   06/23/2020 300 (H) <150 mg/dL Final     Comment:        If a non-fasting specimen was collected, consider  repeat triglyceride testing on a fasting specimen  if clinically indicated. Sheeba Christianson et al. J. of Clin. Lipidol. 0471;2:705-698. HDL Cholesterol   Date Value Ref Range Status   06/23/2020 33 (L) > OR = 50 mg/dL Final   ]  Key Antihyperlipidemia Meds             atorvastatin (LIPITOR) 80 mg tablet (Taking) Take 1 Tablet by mouth daily. Has eye surgery scheduled for 12/14  Going to [facility] specialist  Going to a retina specilist, needs to have blood flushed out 2nd to retinal hemorrhage   Was cleared by cardiology on 9/15/21, but she had to cancel the surgery due to hospitalization    Prior to Admission medications    Medication Sig Start Date End Date Taking? Authorizing Provider   insulin glargine (LANTUS,BASAGLAR) 100 unit/mL (3 mL) inpn Inject 68 units by subcutaneous route nightly. 12/10/21  Yes Lisa Bhandari NP   atorvastatin (LIPITOR) 80 mg tablet Take 1 Tablet by mouth daily. 12/10/21  Yes Lisa Bhandari NP   furosemide (LASIX) 80 mg tablet Take 0.5 Tablets by mouth two (2) times a day.  12/10/21  Yes Lisa Bhandari NP   Insulin Needles, Disposable, (ReliOn Pen Needles) 32 gauge x 5/32\" ndle Use 1 pen needle to inject beneath the skin twice daily 9/7/21  Yes Uriel Owens NP   linaGLIPtin (TRADJENTA) 5 mg tablet Take 1 Tablet by mouth daily. 9/7/21  Yes Jc WILLIS NP   metOLazone (ZAROXOLYN) 5 mg tablet Take 5 mg by mouth two (2) times a day. Patient not taking: Reported on 12/13/2021 10/13/20  Yes Provider, Historical   triamcinolone acetonide (KENALOG) 0.1 % ointment APPLY 1 APPLICATION OF OINTMENT TOPICALLY TO AFFECTED AREA OF THE ARMS AND LEGS TWICE DAILY  Patient not taking: Reported on 12/13/2021 7/19/21  Yes Provider, Historical   allopurinoL (ZYLOPRIM) 300 mg tablet Take 1 Tab by mouth daily. 12/8/20  Yes Lorena Barnes MD   carvediloL (COREG) 6.25 mg tablet Take 1 Tab by mouth two (2) times daily (with meals). 11/6/20  Yes Lorena Barnes MD   hydrALAZINE (APRESOLINE) 100 mg tablet Take 1 Tab by mouth three (3) times daily. 7/14/20  Yes Lorena Barnes MD   aspirin 81 mg chewable tablet Take 81 mg by mouth daily. 12/20/18  Yes Provider, Historical   insulin lispro (HUMALOG) 100 unit/mL kwikpen 8 Units by SubCUTAneous route Before breakfast, lunch, and dinner.  Indications: type 2 diabetes mellitus 12/14/21   Duke Arambula NP     No Known Allergies    Patient Active Problem List   Diagnosis Code    Iron deficiency anemia D50.9    Diabetes mellitus, type II, insulin dependent ([facility] E11.9, Z79.4    Essential hypertension with goal blood pressure less than 130/80 I10    Stage 3b chronic kidney disease (HCC) N18.32    Hyperlipidemia E78.5    Chronic diastolic heart failure (HCC) I50.32    Obesity, morbid ([facility]) E66.01    Hyperuricemia E79.0    Acquired trigger finger M65.30    Anemia of chronic disease D63.8    Chronic midline low back pain without sciatica M54.50, G89.29    Chronic midline thoracic back pain M54.6, G89.29    Proteinuria R80.9    Vitreous hemorrhage, right ([facility]) H43.11    Diabetic polyneuropathy associated with type 2 diabetes mellitus ([facility] E11.42    Myelopathy of cervical spinal cord with cervical radiculopathy ([facility]) G95.9, M54.12 Past Surgical History:   Procedure Laterality Date    HX WISDOM TEETH EXTRACTION       Family History   Problem Relation Age of Onset    Diabetes Mother     No Known Problems Father      Social History     Tobacco Use    Smoking status: Never Smoker    Smokeless tobacco: Never Used   Substance Use Topics    Alcohol use: Not Currently       ROS  As stated in HPI, otherwise all others negative. Objective: There were no vitals taken for this visit. General: alert, cooperative, no distress   Mental  status: normal mood, behavior, speech, dress, motor activity, and thought processes, able to follow commands   HENT: NCAT   Neck: no visualized mass   Resp: no respiratory distress   Neuro: no gross deficits   Skin: no discoloration or lesions of concern on visible areas   Psychiatric: normal affect, consistent with stated mood, no evidence of hallucinations     Additional exam findings: We discussed the expected course, resolution and complications of the diagnosis(es) in detail. Medication risks, benefits, costs, interactions, and alternatives were discussed as indicated. I advised her to contact the office if her condition worsens, changes or fails to improve as anticipated. She expressed understanding with the diagnosis(es) and plan. Adriano Whitaker is a 59 y.o. female who was evaluated by a video visit encounter for concerns as above. Patient identification was verified prior to start of the visit. A caregiver was present when appropriate. Due to this being a TeleHealth encounter (During BUSK-06 public health emergency), evaluation of the following organ systems was limited: Vitals/Constitutional/EENT/Resp/CV/GI//MS/Neuro/Skin/Heme-Lymph-Imm.   Pursuant to the emergency declaration under the [facility] waiver authority and the [employer] Act, this Virtual  Visit was conducted, with patient's (and/or legal guardian's) consent, to reduce the patient's risk of exposure to COVID-19 and provide necessary medical care. Services were provided through a video synchronous discussion virtually to substitute for in-person clinic visit. Patient and provider were located at their individual homes. An After Visit Summary was printed and given to the patient. All diagnosis have been discussed with the patient and all of the patient's questions have been answered. Follow-up and Dispositions    · Return in about 3 months (around 3/10/2022) for DM, HLD, HTN, lymphedema, 30 min, office only. [facility] Hutchins [facility]  [address]   Desean Fiore

## 2021-12-10 NOTE — PROGRESS NOTES
Did you take your medication today? Yes       1. Have you been to the ER, urgent care clinic since your last visit? Hospitalized since your last visit? No    2. Have you seen or consulted any other health care providers outside of the [address] [place] [address] since your last visit? Include any pap smears or colon screening.  No    3 most recent PHQ Screens 12/10/2021   Little interest or pleasure in doing things Not at all   Feeling down, depressed, irritable, or hopeless Not at all   Total Score PHQ 2 0         Health Maintenance Due   Topic Date Due    COVID-19 Vaccine (1) Never done    Cervical cancer screen  Never done    Colorectal Cancer Screening Combo  Never done    Shingrix Vaccine Age 50> (1 of 2) Never done    Breast Cancer Screen Mammogram  Never done    MICROALBUMIN Q1  06/23/2021    Lipid Screen  06/23/2021    DTaP/Tdap/Td series (2 - Td or Tdap) 08/10/2021    Flu Vaccine (1) 09/01/2021    A1C test (Diabetic or Prediabetic)  10/02/2021    Foot Exam Q1  11/29/2021       Learning Assessment 11/8/2019   PRIMARY LEARNER Patient   HIGHEST LEVEL OF EDUCATION - PRIMARY LEARNER  SOME COLLEGE   BARRIERS PRIMARY LEARNER NONE   CO-LEARNER CAREGIVER No   PRIMARY LANGUAGE ENGLISH   LEARNER PREFERENCE PRIMARY READING   ANSWERED BY patient   RELATIONSHIP SELF

## 2021-12-13 ENCOUNTER — OFFICE VISIT (OUTPATIENT)
Dept: FAMILY MEDICINE CLINIC | Age: 61
End: 2021-12-13
Payer: COMMERCIAL

## 2021-12-13 VITALS
DIASTOLIC BLOOD PRESSURE: 88 MMHG | TEMPERATURE: 98.3 F | OXYGEN SATURATION: 98 % | SYSTOLIC BLOOD PRESSURE: 130 MMHG | HEART RATE: 70 BPM | RESPIRATION RATE: 18 BRPM

## 2021-12-13 DIAGNOSIS — E11.9 DIABETES MELLITUS, TYPE II, INSULIN DEPENDENT (HCC): ICD-10-CM

## 2021-12-13 DIAGNOSIS — Z01.818 PREOPERATIVE GENERAL PHYSICAL EXAMINATION: ICD-10-CM

## 2021-12-13 DIAGNOSIS — Z79.4 DIABETES MELLITUS, TYPE II, INSULIN DEPENDENT (HCC): ICD-10-CM

## 2021-12-13 DIAGNOSIS — H43.11 VITREOUS HEMORRHAGE, RIGHT (HCC): Primary | ICD-10-CM

## 2021-12-13 PROCEDURE — 83036 HEMOGLOBIN GLYCOSYLATED A1C: CPT | Performed by: NURSE PRACTITIONER

## 2021-12-13 PROCEDURE — 99214 OFFICE O/P EST MOD 30 MIN: CPT | Performed by: NURSE PRACTITIONER

## 2021-12-13 NOTE — PROGRESS NOTES
Room 10    Patient states I have bee having tightness in left shoulder since I came out the hospital.    Did patient bring someone? Yes: Comment: Jerilyn Palencia    Did the patient have DME equipment? Yes [employer]    Did you take your medication today? No       1. Have you been to the ER, urgent care clinic since your last visit? Hospitalized since your last visit? No    2. Have you seen or consulted any other health care providers outside of the [address] [place] [address] since your last visit? Include any pap smears or colon screening.  Yes Eye Doctor    3 most recent PHQ Screens 12/13/2021   Little interest or pleasure in doing things Not at all   Feeling down, depressed, irritable, or hopeless Not at all   Total Score PHQ 2 0         Health Maintenance Due   Topic Date Due    COVID-19 Vaccine (1) Never done    Cervical cancer screen  Never done    Colorectal Cancer Screening Combo  Never done    Shingrix Vaccine Age 50> (1 of 2) Never done    Breast Cancer Screen Mammogram  Never done    MICROALBUMIN Q1  06/23/2021    Lipid Screen  06/23/2021    DTaP/Tdap/Td series (2 - Td or Tdap) 08/10/2021    Flu Vaccine (1) 09/01/2021    A1C test (Diabetic or Prediabetic)  10/02/2021    Foot Exam Q1  11/29/2021       Learning Assessment 11/8/2019   PRIMARY LEARNER Patient   HIGHEST LEVEL OF EDUCATION - PRIMARY LEARNER  SOME COLLEGE   BARRIERS PRIMARY LEARNER NONE   CO-LEARNER CAREGIVER No   PRIMARY LANGUAGE ENGLISH   LEARNER PREFERENCE PRIMARY READING   ANSWERED BY patient   RELATIONSHIP SELF

## 2021-12-13 NOTE — PROGRESS NOTES
Preoperative Evaluation    Date of Exam: 2021    Nella Garcia is a 59 y.o. female (:1960) who presents for preoperative evaluation. Latex Allergy: no    Problem List:     Patient Active Problem List    Diagnosis Date Noted    Diabetes mellitus, type II, insulin dependent ([address] 2019    Essential hypertension with goal blood pressure less than 130/80 2019    Hyperlipidemia 2019    Chronic diastolic heart failure ([address] 2019    CKD (chronic kidney disease) stage 3, GFR 30-59 ml/min (HCC) 2019    Morbid obesity with body mass index of 45.0-49.9 in [facility]) 2019    Vitreous hemorrhage, right ([address] 2018    Diabetic polyneuropathy associated with type 2 diabetes mellitus ([address] 12/10/2021    Myelopathy of cervical spinal cord with cervical radiculopathy ([facility]) 10/21/2021    Hyperuricemia 2020    Anemia of chronic disease 2020    Obesity, morbid ([address] 2020    Iron deficiency anemia 2019    Chronic midline low back pain without sciatica 2018    Chronic midline thoracic back pain 2018    Abnormal chest CT 2017    Proteinuria 2015    Acquired trigger finger 2013     Medical History:     Past Medical History:   Diagnosis Date    Arthritis     Cataracts, bilateral     Chronic kidney disease, stage 3 ([address]     Diabetes mellitus, type II ([address]     Diastolic congestive heart failure ([address]     Stage C, NYHA class IIIa    Fatty liver     Gout     HLD (hyperlipidemia)     Hypertension     Iron deficiency anemia 2019    Morbid obesity ([address]     Retinopathy     Spinal accessory nerve disorder     Spinal cord disorder (HCC)     Stasis dermatitis of both legs      Allergies:   No Known Allergies   Medications:     Current Outpatient Medications   Medication Sig    insulin glargine (LANTUS,BASAGLAR) 100 unit/mL (3 mL) inpn Inject 68 units by subcutaneous route nightly.     insulin aspart U-100 (NOVOLOG) 100 unit/mL (3 mL) inpn 8 Units by SubCUTAneous route Before breakfast, lunch, and dinner. Indications: type 2 diabetes mellitus    atorvastatin (LIPITOR) 80 mg tablet Take 1 Tablet by mouth daily.  furosemide (LASIX) 80 mg tablet Take 0.5 Tablets by mouth two (2) times a day.  Insulin Needles, Disposable, (ReliOn Pen Needles) 32 gauge x 5/32\" ndle Use 1 pen needle to inject beneath the skin twice daily    linaGLIPtin (TRADJENTA) 5 mg tablet Take 1 Tablet by mouth daily.  allopurinoL (ZYLOPRIM) 300 mg tablet Take 1 Tab by mouth daily.  carvediloL (COREG) 6.25 mg tablet Take 1 Tab by mouth two (2) times daily (with meals).  hydrALAZINE (APRESOLINE) 100 mg tablet Take 1 Tab by mouth three (3) times daily.  aspirin 81 mg chewable tablet Take 81 mg by mouth daily.  metOLazone (ZAROXOLYN) 5 mg tablet Take 5 mg by mouth two (2) times a day. (Patient not taking: Reported on 12/13/2021)    triamcinolone acetonide (KENALOG) 0.1 % ointment APPLY 1 APPLICATION OF OINTMENT TOPICALLY TO AFFECTED AREA OF THE ARMS AND LEGS TWICE DAILY (Patient not taking: Reported on 12/13/2021)     No current facility-administered medications for this visit. Surgical History:     Past Surgical History:   Procedure Laterality Date    HX WISDOM TEETH EXTRACTION       Social History:     Social History     Socioeconomic History    Marital status: UNKNOWN   Tobacco Use    Smoking status: Never Smoker    Smokeless tobacco: Never Used   Substance and Sexual Activity    Alcohol use: Not Currently    Drug use: Never    Sexual activity: Not Currently       Anesthesia Complications: None  History of abnormal bleeding : None  History of Blood Transfusions: no  Health Care Directive or Living Will: no    Objective:     Review of Systems   Constitutional: Negative. Respiratory: Negative. Cardiovascular: Negative. Neurological: Negative. Physical Exam  Vitals and nursing note reviewed. Eyes:      Conjunctiva/sclera: Conjunctivae normal.      Pupils: Pupils are equal, round, and reactive to light. Neck:      Vascular: No JVD. Cardiovascular:      Rate and Rhythm: Normal rate and regular rhythm. Heart sounds: Normal heart sounds. No murmur heard. No friction rub. No gallop. Comments: Bilateral LE brawny edema/lymphedema  Pulmonary:      Effort: Pulmonary effort is normal.      Breath sounds: Normal breath sounds. Musculoskeletal:         General: Normal range of motion. Cervical back: Normal range of motion. Right lower leg: 3+ Edema present. Left lower leg: 3+ Edema present. Skin:     General: Skin is warm and dry. Neurological:      Mental Status: She is alert and oriented to person, place, and time. Psychiatric:         Mood and Affect: Affect normal.         Cognition and Memory: Memory normal.         Judgment: Judgment normal.               DIAGNOSTICS:   1. EKG: EKG FINDINGS - not indicated   2. CXR: not indicated    3.  Labs: not indicated    IMPRESSION:   Pre-op risk  Diabetes mellitus-HGB A1C 6.2%  Low risk for planned surgery  No contraindications to planned surgery    Elizabeth Albrecth NP   12/13/2021

## 2021-12-14 LAB — HBA1C MFR BLD HPLC: 6.3 %

## 2021-12-14 RX ORDER — INSULIN LISPRO 100 [IU]/ML
INJECTION, SOLUTION INTRAVENOUS; SUBCUTANEOUS
Qty: 1 PEN | Refills: 1 | Status: SHIPPED | OUTPATIENT
Start: 2021-12-14

## 2021-12-25 PROBLEM — E66.01 MORBID OBESITY WITH BODY MASS INDEX OF 45.0-49.9 IN ADULT (HCC): Status: RESOLVED | Noted: 2019-09-13 | Resolved: 2021-12-25

## 2021-12-25 PROBLEM — R93.89 ABNORMAL CHEST CT: Status: RESOLVED | Noted: 2017-12-11 | Resolved: 2021-12-25

## 2021-12-25 PROBLEM — N18.32 STAGE 3B CHRONIC KIDNEY DISEASE (HCC): Status: ACTIVE | Noted: 2019-11-08

## 2022-01-03 DIAGNOSIS — E11.9 DIABETES MELLITUS, TYPE II, INSULIN DEPENDENT (HCC): ICD-10-CM

## 2022-01-03 DIAGNOSIS — Z79.4 DIABETES MELLITUS, TYPE II, INSULIN DEPENDENT (HCC): ICD-10-CM

## 2022-01-03 RX ORDER — INSULIN GLARGINE 100 [IU]/ML
INJECTION, SOLUTION SUBCUTANEOUS
Qty: 1 PEN | Refills: 3 | Status: CANCELLED | OUTPATIENT
Start: 2022-01-03

## 2022-01-03 NOTE — TELEPHONE ENCOUNTER
----- Message from Yris Salgadoluisagusto sent at 12/30/2021  1:43 PM EST -----  Subject: Message to Provider    QUESTIONS  Information for Provider? Been in ER recently, did not treat because of   blisters and there's fluid coming out of blisters. Pharmacy stated patient   needed to contact PCP for restrictions on a medication. Needs refills for   insulin.   ---------------------------------------------------------------------------  --------------  CALL BACK INFO  What is the best way for the office to contact you? OK to leave message on   voicemail  Preferred Call Back Phone Number? [phone]  ---------------------------------------------------------------------------  --------------  SCRIPT ANSWERS  Relationship to Patient?  Self

## 2022-01-05 DIAGNOSIS — E11.9 DIABETES MELLITUS, TYPE II, INSULIN DEPENDENT (HCC): ICD-10-CM

## 2022-01-05 DIAGNOSIS — Z79.4 DIABETES MELLITUS, TYPE II, INSULIN DEPENDENT (HCC): ICD-10-CM

## 2022-01-05 RX ORDER — INSULIN GLARGINE 100 [IU]/ML
INJECTION, SOLUTION SUBCUTANEOUS
Qty: 9 PEN | Refills: 3 | Status: SHIPPED | OUTPATIENT
Start: 2022-01-05